# Patient Record
Sex: FEMALE | Race: BLACK OR AFRICAN AMERICAN | Employment: FULL TIME | URBAN - METROPOLITAN AREA
[De-identification: names, ages, dates, MRNs, and addresses within clinical notes are randomized per-mention and may not be internally consistent; named-entity substitution may affect disease eponyms.]

---

## 2017-08-21 ENCOUNTER — HOSPITAL ENCOUNTER (OUTPATIENT)
Dept: MAMMOGRAPHY | Age: 42
Discharge: HOME | End: 2017-08-21
Attending: INTERNAL MEDICINE
Payer: COMMERCIAL

## 2017-08-21 DIAGNOSIS — Z12.31 VISIT FOR SCREENING MAMMOGRAM: ICD-10-CM

## 2017-08-21 PROCEDURE — 77067 SCR MAMMO BI INCL CAD: CPT

## 2018-08-24 ENCOUNTER — HOSPITAL ENCOUNTER (OUTPATIENT)
Dept: MAMMOGRAPHY | Age: 43
Discharge: HOME | End: 2018-08-24
Attending: NURSE PRACTITIONER
Payer: COMMERCIAL

## 2018-08-24 DIAGNOSIS — Z12.31 VISIT FOR SCREENING MAMMOGRAM: ICD-10-CM

## 2018-08-24 PROCEDURE — 77067 SCR MAMMO BI INCL CAD: CPT

## 2019-01-16 ENCOUNTER — APPOINTMENT (OUTPATIENT)
Dept: CT IMAGING | Age: 44
End: 2019-01-16
Attending: EMERGENCY MEDICINE
Payer: COMMERCIAL

## 2019-01-16 ENCOUNTER — APPOINTMENT (OUTPATIENT)
Dept: MRI IMAGING | Age: 44
End: 2019-01-16
Attending: HOSPITALIST
Payer: COMMERCIAL

## 2019-01-16 ENCOUNTER — HOSPITAL ENCOUNTER (OUTPATIENT)
Age: 44
Setting detail: OBSERVATION
Discharge: HOME | End: 2019-01-17
Attending: EMERGENCY MEDICINE | Admitting: HOSPITALIST
Payer: COMMERCIAL

## 2019-01-16 ENCOUNTER — APPOINTMENT (OUTPATIENT)
Dept: GENERAL RADIOLOGY | Age: 44
End: 2019-01-16
Attending: EMERGENCY MEDICINE
Payer: COMMERCIAL

## 2019-01-16 DIAGNOSIS — I63.9 CEREBROVASCULAR ACCIDENT (CVA), UNSPECIFIED MECHANISM (HCC): Primary | ICD-10-CM

## 2019-01-16 LAB
ABO + RH BLD: NORMAL
ALBUMIN SERPL-MCNC: 3.3 G/DL (ref 3.4–5)
ALBUMIN/GLOB SERPL: 1 {RATIO} (ref 0.8–1.7)
ALP SERPL-CCNC: 111 U/L (ref 45–117)
ALT SERPL-CCNC: 13 U/L (ref 13–56)
AMPHET UR QL SCN: NEGATIVE
ANION GAP SERPL CALC-SCNC: 5 MMOL/L (ref 3–18)
APPEARANCE UR: ABNORMAL
ASPIRIN TEST, ASPIRN: 564 ARU (ref 620–672)
AST SERPL-CCNC: 14 U/L (ref 15–37)
ATRIAL RATE: 87 BPM
BACTERIA URNS QL MICRO: ABNORMAL /HPF
BARBITURATES UR QL SCN: NEGATIVE
BENZODIAZ UR QL: NEGATIVE
BILIRUB SERPL-MCNC: 0.3 MG/DL (ref 0.2–1)
BILIRUB UR QL: NEGATIVE
BLOOD GROUP ANTIBODIES SERPL: NORMAL
BUN SERPL-MCNC: 12 MG/DL (ref 7–18)
BUN/CREAT SERPL: 11 (ref 12–20)
CALCIUM SERPL-MCNC: 8.3 MG/DL (ref 8.5–10.1)
CALCULATED P AXIS, ECG09: 51 DEGREES
CALCULATED R AXIS, ECG10: 26 DEGREES
CALCULATED T AXIS, ECG11: 44 DEGREES
CANNABINOIDS UR QL SCN: NEGATIVE
CHLORIDE SERPL-SCNC: 106 MMOL/L (ref 100–108)
CHOLEST SERPL-MCNC: 153 MG/DL
CO2 SERPL-SCNC: 27 MMOL/L (ref 21–32)
COCAINE UR QL SCN: NEGATIVE
COLOR UR: YELLOW
CREAT SERPL-MCNC: 1.12 MG/DL (ref 0.6–1.3)
DIAGNOSIS, 93000: NORMAL
EPITH CASTS URNS QL MICRO: ABNORMAL /LPF (ref 0–5)
ERYTHROCYTE [DISTWIDTH] IN BLOOD BY AUTOMATED COUNT: 13.2 % (ref 11.6–14.5)
ERYTHROCYTE [SEDIMENTATION RATE] IN BLOOD: 7 MM/HR (ref 0–20)
EST. AVERAGE GLUCOSE BLD GHB EST-MCNC: 103 MG/DL
FIBRINOGEN PPP-MCNC: 464 MG/DL (ref 210–451)
GLOBULIN SER CALC-MCNC: 3.4 G/DL (ref 2–4)
GLUCOSE BLD STRIP.AUTO-MCNC: 123 MG/DL (ref 70–110)
GLUCOSE BLD STRIP.AUTO-MCNC: 83 MG/DL (ref 70–110)
GLUCOSE BLD STRIP.AUTO-MCNC: 83 MG/DL (ref 70–110)
GLUCOSE SERPL-MCNC: 86 MG/DL (ref 74–99)
GLUCOSE UR STRIP.AUTO-MCNC: NEGATIVE MG/DL
HBA1C MFR BLD: 5.2 % (ref 4.2–5.6)
HCT VFR BLD AUTO: 43.5 % (ref 35–45)
HDLC SERPL-MCNC: 42 MG/DL (ref 40–60)
HDLC SERPL: 3.6 {RATIO} (ref 0–5)
HDSCOM,HDSCOM: NORMAL
HGB BLD-MCNC: 14.4 G/DL (ref 12–16)
HGB UR QL STRIP: NEGATIVE
INR PPP: 1 (ref 0.8–1.2)
KETONES UR QL STRIP.AUTO: NEGATIVE MG/DL
LDLC SERPL CALC-MCNC: 87.6 MG/DL (ref 0–100)
LEUKOCYTE ESTERASE UR QL STRIP.AUTO: ABNORMAL
LIPID PROFILE,FLP: NORMAL
MCH RBC QN AUTO: 28.6 PG (ref 24–34)
MCHC RBC AUTO-ENTMCNC: 33.1 G/DL (ref 31–37)
MCV RBC AUTO: 86.5 FL (ref 74–97)
METHADONE UR QL: NEGATIVE
NITRITE UR QL STRIP.AUTO: NEGATIVE
OPIATES UR QL: NEGATIVE
P-R INTERVAL, ECG05: 180 MS
PCP UR QL: NEGATIVE
PH UR STRIP: 6 [PH] (ref 5–8)
PLATELET # BLD AUTO: 170 K/UL (ref 135–420)
PMV BLD AUTO: 10.4 FL (ref 9.2–11.8)
POTASSIUM SERPL-SCNC: 4.3 MMOL/L (ref 3.5–5.5)
PROT SERPL-MCNC: 6.7 G/DL (ref 6.4–8.2)
PROT UR STRIP-MCNC: NEGATIVE MG/DL
PROTHROMBIN TIME: 13.1 SEC (ref 11.5–15.2)
Q-T INTERVAL, ECG07: 370 MS
QRS DURATION, ECG06: 94 MS
QTC CALCULATION (BEZET), ECG08: 445 MS
RBC # BLD AUTO: 5.03 M/UL (ref 4.2–5.3)
RBC #/AREA URNS HPF: 0 /HPF (ref 0–5)
SODIUM SERPL-SCNC: 138 MMOL/L (ref 136–145)
SP GR UR REFRACTOMETRY: 1.02 (ref 1–1.03)
SPECIMEN EXP DATE BLD: NORMAL
T3FREE SERPL-MCNC: 2.2 PG/ML (ref 2.18–3.98)
T4 FREE SERPL-MCNC: 1 NG/DL (ref 0.7–1.5)
THROMBIN TIME: 14.9 SECS (ref 13.8–18.2)
TRIGL SERPL-MCNC: 117 MG/DL (ref ?–150)
TSH SERPL DL<=0.05 MIU/L-ACNC: 1.5 UIU/ML (ref 0.36–3.74)
UROBILINOGEN UR QL STRIP.AUTO: 1 EU/DL (ref 0.2–1)
VENTRICULAR RATE, ECG03: 87 BPM
VLDLC SERPL CALC-MCNC: 23.4 MG/DL
WBC # BLD AUTO: 11.7 K/UL (ref 4.6–13.2)
WBC URNS QL MICRO: ABNORMAL /HPF (ref 0–4)

## 2019-01-16 PROCEDURE — 82962 GLUCOSE BLOOD TEST: CPT

## 2019-01-16 PROCEDURE — 85384 FIBRINOGEN ACTIVITY: CPT

## 2019-01-16 PROCEDURE — 71045 X-RAY EXAM CHEST 1 VIEW: CPT

## 2019-01-16 PROCEDURE — 85652 RBC SED RATE AUTOMATED: CPT

## 2019-01-16 PROCEDURE — 70544 MR ANGIOGRAPHY HEAD W/O DYE: CPT

## 2019-01-16 PROCEDURE — 74011250636 HC RX REV CODE- 250/636: Performed by: HOSPITALIST

## 2019-01-16 PROCEDURE — 92610 EVALUATE SWALLOWING FUNCTION: CPT

## 2019-01-16 PROCEDURE — 85670 THROMBIN TIME PLASMA: CPT

## 2019-01-16 PROCEDURE — 96361 HYDRATE IV INFUSION ADD-ON: CPT

## 2019-01-16 PROCEDURE — 93005 ELECTROCARDIOGRAM TRACING: CPT

## 2019-01-16 PROCEDURE — 85027 COMPLETE CBC AUTOMATED: CPT

## 2019-01-16 PROCEDURE — 74011250637 HC RX REV CODE- 250/637: Performed by: HOSPITALIST

## 2019-01-16 PROCEDURE — 83036 HEMOGLOBIN GLYCOSYLATED A1C: CPT

## 2019-01-16 PROCEDURE — 99285 EMERGENCY DEPT VISIT HI MDM: CPT

## 2019-01-16 PROCEDURE — 77030021566 MRA NECK W WO CONT

## 2019-01-16 PROCEDURE — 99218 HC RM OBSERVATION: CPT

## 2019-01-16 PROCEDURE — 65660000000 HC RM CCU STEPDOWN

## 2019-01-16 PROCEDURE — 80307 DRUG TEST PRSMV CHEM ANLYZR: CPT

## 2019-01-16 PROCEDURE — 70450 CT HEAD/BRAIN W/O DYE: CPT

## 2019-01-16 PROCEDURE — 84439 ASSAY OF FREE THYROXINE: CPT

## 2019-01-16 PROCEDURE — 85576 BLOOD PLATELET AGGREGATION: CPT

## 2019-01-16 PROCEDURE — 81001 URINALYSIS AUTO W/SCOPE: CPT

## 2019-01-16 PROCEDURE — A9575 INJ GADOTERATE MEGLUMI 0.1ML: HCPCS | Performed by: HOSPITALIST

## 2019-01-16 PROCEDURE — 77030021352 HC CBL LD SYS DISP COVD -B

## 2019-01-16 PROCEDURE — 94760 N-INVAS EAR/PLS OXIMETRY 1: CPT

## 2019-01-16 PROCEDURE — 96372 THER/PROPH/DIAG INJ SC/IM: CPT

## 2019-01-16 PROCEDURE — 86900 BLOOD TYPING SEROLOGIC ABO: CPT

## 2019-01-16 PROCEDURE — 74011000258 HC RX REV CODE- 258: Performed by: HOSPITALIST

## 2019-01-16 PROCEDURE — 86141 C-REACTIVE PROTEIN HS: CPT

## 2019-01-16 PROCEDURE — 74011250637 HC RX REV CODE- 250/637: Performed by: EMERGENCY MEDICINE

## 2019-01-16 PROCEDURE — 84481 FREE ASSAY (FT-3): CPT

## 2019-01-16 PROCEDURE — 96360 HYDRATION IV INFUSION INIT: CPT

## 2019-01-16 PROCEDURE — 85610 PROTHROMBIN TIME: CPT

## 2019-01-16 PROCEDURE — 84443 ASSAY THYROID STIM HORMONE: CPT

## 2019-01-16 PROCEDURE — 80053 COMPREHEN METABOLIC PANEL: CPT

## 2019-01-16 PROCEDURE — 80061 LIPID PANEL: CPT

## 2019-01-16 PROCEDURE — 70553 MRI BRAIN STEM W/O & W/DYE: CPT

## 2019-01-16 RX ORDER — ASPIRIN 325 MG
325 TABLET ORAL
Status: COMPLETED | OUTPATIENT
Start: 2019-01-16 | End: 2019-01-16

## 2019-01-16 RX ORDER — DEXTROSE MONOHYDRATE AND SODIUM CHLORIDE 5; .9 G/100ML; G/100ML
0.75 INJECTION, SOLUTION INTRAVENOUS CONTINUOUS
Status: DISCONTINUED | OUTPATIENT
Start: 2019-01-16 | End: 2019-01-17

## 2019-01-16 RX ORDER — ATORVASTATIN CALCIUM 40 MG/1
80 TABLET, FILM COATED ORAL
Status: DISCONTINUED | OUTPATIENT
Start: 2019-01-16 | End: 2019-01-17 | Stop reason: HOSPADM

## 2019-01-16 RX ORDER — ACETAMINOPHEN 325 MG/1
650 TABLET ORAL
Status: DISCONTINUED | OUTPATIENT
Start: 2019-01-16 | End: 2019-01-17 | Stop reason: HOSPADM

## 2019-01-16 RX ORDER — ONDANSETRON 2 MG/ML
2 INJECTION INTRAMUSCULAR; INTRAVENOUS
Status: DISCONTINUED | OUTPATIENT
Start: 2019-01-16 | End: 2019-01-17 | Stop reason: HOSPADM

## 2019-01-16 RX ORDER — ATORVASTATIN CALCIUM 40 MG/1
80 TABLET, FILM COATED ORAL
Status: DISCONTINUED | OUTPATIENT
Start: 2019-01-16 | End: 2019-01-16

## 2019-01-16 RX ORDER — ENOXAPARIN SODIUM 100 MG/ML
40 INJECTION SUBCUTANEOUS EVERY 24 HOURS
Status: DISCONTINUED | OUTPATIENT
Start: 2019-01-16 | End: 2019-01-17 | Stop reason: HOSPADM

## 2019-01-16 RX ORDER — GADOTERATE MEGLUMINE 376.9 MG/ML
20 INJECTION INTRAVENOUS
Status: COMPLETED | OUTPATIENT
Start: 2019-01-16 | End: 2019-01-16

## 2019-01-16 RX ORDER — AMOXICILLIN 250 MG
2 CAPSULE ORAL
Status: DISCONTINUED | OUTPATIENT
Start: 2019-01-16 | End: 2019-01-17 | Stop reason: HOSPADM

## 2019-01-16 RX ORDER — ASPIRIN 325 MG
325 TABLET ORAL DAILY
Status: DISCONTINUED | OUTPATIENT
Start: 2019-01-17 | End: 2019-01-17 | Stop reason: HOSPADM

## 2019-01-16 RX ADMIN — DOCUSATE SODIUM AND SENNOSIDES 2 TABLET: 8.6; 5 TABLET, FILM COATED ORAL at 22:11

## 2019-01-16 RX ADMIN — GADOTERATE MEGLUMINE 20 ML: 376.9 INJECTION INTRAVENOUS at 20:46

## 2019-01-16 RX ADMIN — ENOXAPARIN SODIUM 40 MG: 40 INJECTION, SOLUTION INTRAVENOUS; SUBCUTANEOUS at 16:05

## 2019-01-16 RX ADMIN — DEXTROSE MONOHYDRATE AND SODIUM CHLORIDE 0.75 ML/KG/HR: 5; .9 INJECTION, SOLUTION INTRAVENOUS at 14:44

## 2019-01-16 RX ADMIN — ASPIRIN 325 MG ORAL TABLET 325 MG: 325 PILL ORAL at 12:13

## 2019-01-16 RX ADMIN — ATORVASTATIN CALCIUM 80 MG: 40 TABLET, FILM COATED ORAL at 22:10

## 2019-01-16 NOTE — PROGRESS NOTES
completed the initial Spiritual Assessment of the patient in bed 3 of the emergency room  and offered Pastoral Care support. . Patient does not have any Religion/cultural needs that will affect patients preferences in health care. Chaplains will continue to follow and will provide pastoral care on an as needed/requested basis. Dominic Killian Board Certified [employer] Spiritual Care Department [phone]

## 2019-01-16 NOTE — H&P
Medicine History and Physical 
Patient: Zohreh Pennington   Age:  44 y.o. Assessment Principal Problem: 
  CVA (cerebral vascular accident) ([facility] [address] (1/16/2019) Plan 1)  CVA 
 - possible, has sensory deficit L facial and left arm and facial droop 
 - ct negative, MRI MRA head and neck. Bedside nurse speech eval, PT/OT 
 - tele monitor, neurochecks, asa statin 
 - echo DISPO 
-Pt to be admitted  at this time for reasons addressed above, continued hospitalization for ongoing assessment and treatment indicated Anticipated Date of Discharge: 1-2 days Anticipated Disposition (home, SNF) : home Chief Complaint:  
Chief Complaint Patient presents with  Tingling  Facial Droop HPI:  
Zohreh Pennington is a 44y.o. year old female who presents with  L facial droop. Patient was at work today and coworkers noted facial droop and told her to home to ED. She has history of migraines but no other significant history. In ED ct head was negative, she refused tpa when seen by tele neuro. She will be admitted for further CVA workup Review of Systems - positive responses in bold type Constitutional: Negative for fever, chills, diaphoresis and unexpected weight change. HENT: Negative for ear pain, congestion, sore throat, rhinorrhea, drooling, trouble swallowing, neck pain and tinnitus. Eyes: Negative for photophobia, pain, redness and visual disturbance. Respiratory: negative for shortness of breath, cough, choking, chest tightness, wheezing or stridor. Cardiovascular: Negative for chest pain, palpitations and leg swelling. Gastrointestinal: Negative for nausea, vomiting, abdominal pain, diarrhea, constipation, blood in stool, abdominal distention and anal bleeding. Genitourinary: Negative for dysuria, urgency, frequency, hematuria, flank pain and difficulty urinating. Musculoskeletal: Negative for back pain and arthralgias. Skin: Negative for color change, rash and wound. Neurological: Negative for dizziness, seizures, syncope, speech difficulty, light-headedness or headaches. Hematological: Does not bruise/bleed easily. Psychiatric/Behavioral: Negative for suicidal ideas, hallucinations, behavioral problems, self-injury or agitation Past Medical History: 
Past Medical History:  
Diagnosis Date  Migraines Past Surgical History: 
Past Surgical History:  
Procedure Laterality Date [address]  
 two times  HX HYSTERECTOMY Family History: 
Family History Problem Relation Age of Onset  Cancer Other   
     mother side of family  Diabetes Other   
     mother side of family  Hypertension Other   
     mother side of family  Breast Cancer Paternal Grandmother   
       Breast Cancer Maternal Aunt  Social History: 
Social History Socioeconomic History  Marital status:  Spouse name: Not on file  Number of children: Not on file  Years of education: Not on file  Highest education level: Not on file Tobacco Use  Smoking status: Current Every Day Smoker Packs/day: 0.50 Types: Cigarettes  Smokeless tobacco: Never Used Substance and Sexual Activity  Alcohol use: Yes Alcohol/week: 0.5 oz Types: 1 drink(s) per week Comment: wine occasionally  Drug use: No  
 
 
Home Medications: 
Prior to Admission medications Not on File Allergies: Allergies Allergen Reactions  Banana Nausea Only  Seasonale [Levonorgestrel-Ethinyl Estrad] Runny Nose  Shellfish Derived Nausea Only  Wellbutrin [Bupropion] Other (comments) Amnesia type symptoms Physical Exam:  
 
Visit Vitals /79 (BP 1 Location: Right arm, BP Patient Position: At rest) Pulse 71 Temp 97.8 °F (36.6 °C) Resp 16 Ht 5' 9\" (1.753 m) Wt 113.4 kg (249 lb 16 oz) SpO2 100% BMI 36.92 kg/m² Physical Exam: 
General appearance: alert, cooperative, no distress, appears stated age Head: Normocephalic, without obvious abnormality, atraumatic Neck: supple, trachea midline Lungs: clear to auscultation bilaterally Heart: regular rate and rhythm, S1, S2 normal, no murmur, click, rub or gallop Abdomen: soft, non-tender. Bowel sounds normal. No masses,  no organomegaly Extremities: extremities normal, atraumatic, no cyanosis or edema Skin: Skin color, texture, turgor normal. No rashes or lesions Neurologic: mild facial droop, reported sensory deficit left face and left upper arm. Intake and Output: 
Current Shift:  No intake/output data recorded. Last three shifts:  No intake/output data recorded. Lab/Data Reviewed: 
CMP:  
Lab Results Component Value Date/Time  01/16/2019 11:30 AM  
 K 4.3 01/16/2019 11:30 AM  
  01/16/2019 11:30 AM  
 CO2 27 01/16/2019 11:30 AM  
 AGAP 5 01/16/2019 11:30 AM  
 GLU 86 01/16/2019 11:30 AM  
 BUN 12 01/16/2019 11:30 AM  
 CREA 1.12 01/16/2019 11:30 AM  
 GFRAA >60 01/16/2019 11:30 AM  
 GFRNA 53 (L) 01/16/2019 11:30 AM  
 CA 8.3 (L) 01/16/2019 11:30 AM  
 ALB 3.3 (L) 01/16/2019 11:30 AM  
 TP 6.7 01/16/2019 11:30 AM  
 GLOB 3.4 01/16/2019 11:30 AM  
 AGRAT 1.0 01/16/2019 11:30 AM  
 SGOT 14 (L) 01/16/2019 11:30 AM  
 ALT 13 01/16/2019 11:30 AM  
 
CBC:  
Lab Results Component Value Date/Time WBC 11.7 01/16/2019 11:30 AM  
 HGB 14.4 01/16/2019 11:30 AM  
 HCT 43.5 01/16/2019 11:30 AM  
  01/16/2019 11:30 AM  
 
All Cardiac Markers in the last 24 hours: No results found for: CPK, CK, CKMMB, CKMB, RCK3, CKMBT, CKNDX, CKND1, TERRI, TROPT, TROIQ, THERON, TROPT, TNIPOC, BNP, BNPP Shari Warren MD 
 
January 16, 2019

## 2019-01-16 NOTE — PROGRESS NOTES
Problem: Dysphagia (Adult) Goal: *Acute Goals and Plan of Care (Insert Text) Dysphagia Present:   No 
 
Recommendations: 
Diet: Regular/thin Meds: Per patient preference Patient will: 1. Participate in training and education related to continued aspiration risk, diet recs and compensatory strategies (goal met). Outcome: Resolved/Met Date Met: 01/16/19 Speech LAnguage Pathology bedside swallow evaluation AND DISCHARGE Patient: Leonardo Montesinos (23 y.o. female) Date: 1/16/2019 Primary Diagnosis: CVA (cerebral vascular accident) ([address] Precautions: None PLOF: Independent ASSESSMENT : 
Clinical beside swallow eval completed per MD orders. Pt A&Ox4. Functional communication. Intelligibility >90%. Cognitive-linguistic function appears intact. OM examination revealed oral motor structures functional for mastication and deglutition. Presented with thin liquid, puree, and solid trials. Exhibited + bolus cohesion, manipulation and A-P transit. Further exhibited + swallow timing/reflex and hyolaryngeal excursion. Pt able to manipulate and clear with 0 clinical s/s aspiration and/or oropharyngeal dysphagia. Pt safe for regular solid, thin liquid diet. 0 formal ST needs for dysphagia indicated at this time. SLP educated pt on role of speech therapist in current setting with re: speech/swallow; verbalized comprehension. SLP available for re-evaluation if indicated by MD. Results d/w RN, Angelica Hall. Thank you for this referral.  
Corrie Rios, SLP  
 
PLAN : 
Recommendations and Planned Interventions: 
No formal ST needs ID'd for dysphagia. Eval only. Discharge Recommendations: None SUBJECTIVE:  
Patient stated I haven't had pudding in forever. OBJECTIVE:  
 
Past Medical History:  
Diagnosis Date  Migraines Past Surgical History:  
Procedure Laterality Date [address]  
 two times  HX HYSTERECTOMY Prior Level of Function/Home Situation: Independent Diet prior to admission: Regular/thin Current Diet:  Regular/thin Cognitive and Communication Status: 
Neurologic State: Alert Orientation Level: Oriented X4 Cognition: Appropriate for age attention/concentration, Follows commands The severity rating is based on the following outcomes: EDDIE Noms Swallow Level 7 Clinical Judgement PAIN: 
Start of Eval: 0 End of Eval: 0 After evaluation:  
[]            Patient left in no apparent distress sitting up in chair 
[x]            Patient left in no apparent distress in bed 
[x]            Call bell left within reach [x]            Nursing notified 
[x]            Family present 
[]            Caregiver present 
[]            Bed alarm activated COMMUNICATION/EDUCATION:  
[x]            Aspiration precautions; swallow safety; compensatory techniques. [x]            Patient/family have participated as able in goal setting and plan of care. [x]            Patient/family agree to work toward stated goals and plan of care. []            Patient understands intent and goals of therapy; neutral about participation. []            Patient unable to participate in goal setting/plan of care; educ ongoing with interdisciplinary staff 
[]         Posted safety precautions in patient's room. Thank you for this referral. 
Rommel Warren SLP Time Calculation: 14 mins

## 2019-01-16 NOTE — PROGRESS NOTES
Problem: Falls - Risk of 
Goal: *Absence of Falls Document Brittanie Landa Fall Risk and appropriate interventions in the flowsheet. Outcome: Progressing Towards Goal 
Fall Risk Interventions: 
  
 
  
 
Medication Interventions: Patient to call before getting OOB

## 2019-01-16 NOTE — PROGRESS NOTES
1443: PT orders received and chart reviewed. PT screen indicated. Patient reports that she is independent with mobility and does not need skilled PT. Will d/c orders at this time. No d/c recommendations. Santhosh Garica PT, DPT Office extension: [phone] Pager #: [phone]

## 2019-01-16 NOTE — ED NOTES
Pt transported to 2105 for admission. Pavan Ragland RN present to receive pt and perform dual NIH. Report given and all questions answered. Pt in no distress at time of admission.

## 2019-01-16 NOTE — ED TRIAGE NOTES
\"I started having a tingling in the back of my head, then my vision went blurry. My coworker said that the left side of my face went droopy. The left side of my face feels a little numb too. \"

## 2019-01-16 NOTE — ED NOTES
TRANSFER - ED to INPATIENT REPORT: 
 
SBAR report made available to receiving floor on this patient being transferred to [facility] (2100  for routine progression of care Admitting diagnosis CVA (cerebral vascular accident) ([facility] [address] Information from the following report(s) SBAR, ED Summary and Intake/Output was made available to receiving floor. Lines:  
Peripheral IV 01/16/19 Left Antecubital (Active) Site Assessment Clean, dry, & intact 1/16/2019 12:16 PM  
Phlebitis Assessment 0 1/16/2019 12:16 PM  
Infiltration Assessment 0 1/16/2019 12:16 PM  
Dressing Status Clean, dry, & intact 1/16/2019 12:16 PM  
Dressing Type Transparent 1/16/2019 12:16 PM  
Hub Color/Line Status Pink 1/16/2019 12:16 PM  
  
 
 
Patient is oriented to time, place, person and situation Valuables transported with patient Vitals w/ MEWS Score (last day) Date/Time MEWS Score Pulse Resp Temp BP Level of Consciousness SpO2  
 01/16/19 1300    80  18    104/67    100 % 01/16/19 1245    79  19    126/57    99 % 01/16/19 1230    83  17    115/60    95 % 01/16/19 1215    82  18  98.5 °F (36.9 °C)  107/71    81 %  (Abnormal) 01/16/19 1200    93      121/78    100 % 01/16/19 1150    86  20    105/69    100 % 01/16/19 1145    88  19    127/78    100 % 01/16/19 1140    88  16    124/84    100 % 01/16/19 1135    87  22    115/86    100 % 01/16/19 1130    85  19    80/65  (Abnormal)     100 % 01/16/19 1125    87  16    108/59    100 % 01/16/19 1122    87  19        100 % 01/16/19 1121    84  17        100 % 01/16/19 1120    85  16        100 % 01/16/19 1119    83  16        100 % 01/16/19 1118    82  15        100 % 01/16/19 1113          104/59      
 01/16/19 1102    101  (Abnormal)   16    145/96  (Abnormal)   Alert  98 %

## 2019-01-17 ENCOUNTER — APPOINTMENT (OUTPATIENT)
Dept: NON INVASIVE DIAGNOSTICS | Age: 44
End: 2019-01-17
Attending: HOSPITALIST
Payer: COMMERCIAL

## 2019-01-17 VITALS
HEART RATE: 79 BPM | DIASTOLIC BLOOD PRESSURE: 70 MMHG | TEMPERATURE: 97.3 F | WEIGHT: 249 LBS | RESPIRATION RATE: 17 BRPM | BODY MASS INDEX: 36.88 KG/M2 | SYSTOLIC BLOOD PRESSURE: 104 MMHG | OXYGEN SATURATION: 93 % | HEIGHT: 69 IN

## 2019-01-17 PROBLEM — G43.909 MIGRAINE: Status: ACTIVE | Noted: 2019-01-17

## 2019-01-17 PROBLEM — G45.9 TIA (TRANSIENT ISCHEMIC ATTACK): Status: ACTIVE | Noted: 2019-01-17

## 2019-01-17 PROBLEM — G43.109 COMPLICATED MIGRAINE: Status: ACTIVE | Noted: 2019-01-17

## 2019-01-17 LAB
CRP SERPL HS-MCNC: 7.65 MG/L (ref 0–3)
GLUCOSE BLD STRIP.AUTO-MCNC: 83 MG/DL (ref 70–110)

## 2019-01-17 PROCEDURE — 82962 GLUCOSE BLOOD TEST: CPT

## 2019-01-17 PROCEDURE — 74011000258 HC RX REV CODE- 258: Performed by: HOSPITALIST

## 2019-01-17 PROCEDURE — 93306 TTE W/DOPPLER COMPLETE: CPT

## 2019-01-17 PROCEDURE — 96361 HYDRATE IV INFUSION ADD-ON: CPT

## 2019-01-17 PROCEDURE — 99218 HC RM OBSERVATION: CPT

## 2019-01-17 PROCEDURE — 74011250637 HC RX REV CODE- 250/637: Performed by: HOSPITALIST

## 2019-01-17 RX ORDER — ASPIRIN 81 MG/1
81 TABLET ORAL DAILY
Qty: 30 TAB | Refills: 0 | Status: SHIPPED | OUTPATIENT
Start: 2019-01-17 | End: 2021-10-19

## 2019-01-17 RX ADMIN — ASPIRIN 325 MG ORAL TABLET 325 MG: 325 PILL ORAL at 09:29

## 2019-01-17 RX ADMIN — DEXTROSE MONOHYDRATE AND SODIUM CHLORIDE 0.75 ML/KG/HR: 5; .9 INJECTION, SOLUTION INTRAVENOUS at 03:51

## 2019-01-17 NOTE — PROGRESS NOTES
NUTRITIONPatient/Family Education Record FACTORS THAT MAY INFLUENCE PATIENTS ABILITY TO LEARN: []   Language barrier    []   Cultural needs   []   Motivation  
 []   Cognitive limitation    []   Physical   []   Education  
[]   Physiological factors   []   Hearing/vision/speaking impairment   []   Orthodoxy beliefs []   Financial limitations    []  Other:   [x]   No barriers limiting ability to learn Person Instructed: [x]   Patient   []   Family   []  Other Preference for Learning: 
 [x]   Verbal   [x]   Written   []  Demonstration Patient educated on:  
[] Cardiac/heart healthy diet 
[] 2gm Sodium diet 
[] Vitamin K regulated diet (coumadin) [x] Weight loss/portion control 
[] High protein 
[] Other: 
 
Goal: 
Patient will demonstrate understanding of modified diet by implementing suggestions into lifestyle changes to decrease weight. Outcome:  
[x]  Patient verbalized understanding of education and willing to comply with recommendations. []  Patient declined education 
[]  Patient needs follow up education; scheduled date for follow up: 
[x]  Written information provided 
[x]  RD contact information provided Hugo Liz

## 2019-01-17 NOTE — ROUTINE PROCESS
Bedside and Verbal shift change report given to Yara Miles RN (oncoming nurse) by Carrie Rosado RN, BSN (offgoing nurse). Report given with SBAR, Kardex, Intake/Output, MAR and Recent Results.

## 2019-01-17 NOTE — PROGRESS NOTES
Patient and/or next of kin has been given the Outpatient Observation Information and Notification letter and all questions answered. Angela Sanchez RN,ext [phone].

## 2019-01-17 NOTE — PROGRESS NOTES
Reason for Admission:  TIA  R/O CVA RRAT Score:   1 Plan for utilizing home health:   Not home bound Likelihood of Readmission:  Low/Green Transition of Care Plan:  Home with out-pt follow up. Chart reviewed. Met with pt., verified all demographics. States has optima ins. States gets her medical follow up with Scott Gonzalez, last seen approx 1 yr ago. NOK: Bryn Jones, dtr, 23yrs old, lives with pt. Uses no DME. Independent with ADL's prior to admit. Available as needed. Angela Sanchez,RN,ext [phone]. Patient has designated no one to participate in his/her discharge plan and to receive any needed information, as she drove herself to hospital & will drive herself home. Name:  
Address: 
Phone number: 
 
Care Management Interventions PCP Verified by CM: Yes(Kavitha Pressley NP, last seen approx 1 yr ago) Palliative Care Criteria Met (RRAT>21 & CHF Dx)?: No 
Mode of Transport at Discharge: Self Transition of Care Consult (CM Consult): Discharge Planning Discharge Durable Medical Equipment: No 
Physical Therapy Consult: Yes Occupational Therapy Consult: Yes Speech Therapy Consult: Yes Current Support Network: Other(adult child lives with pt, 23yrs old) Confirm Follow Up Transport: Self Plan discussed with Pt/Family/Caregiver: Yes Discharge Location Discharge Placement: Home

## 2019-01-17 NOTE — ROUTINE PROCESS
8173 assumed care of pt after bedside verbal report was given by off going nurse, pt resting in bed awake, D5 normal saline infusing at 85.1 ml/hr, no acute distress noted, pt denies c/o pain, NIH scale completed,will monitor

## 2019-01-17 NOTE — PROGRESS NOTES
Nutrition initial assessment/Plan of care RECOMMENDATIONS:  
1. Regular Diet 2. Monitor labs, weight and PO intake 3. RD to follow GOALS:  
1. PO intake meets >75% of protein/calorie needs by 1/24 
2. Weight Maintenance/gradual loss (1-2 lb/week) by 1/24 ASSESSMENT:  
Wt status is classified as obese per BMI of 36.9. Adequate PO intake. Labs noted. BG range () over the past 24 hours. Pt lipid panel and A1c (5.2) WNL. Nutrition recommendations listed. RD to follow. Nutrition Diagnoses:  
Obesity related to excessive energy intake PTA as evidenced by a BMI of 36.9 and 172% IBW. Nutrition Risk:  [] High  [] Moderate [x]  Low SUBJECTIVE/OBJECTIVE:  
Pt admitted for CVA and complicated migraine. Pt seen in room OOB in chair at lunch; observed 100% of meal consumed. Denies having any problems chewing/swallowing and stable weight PTA. Reports having a good appetite and consuming 3 meals per day at home. Discussed general healthful diet, CHO choices and portion control; handouts provided. Will monitor. Information Obtained from:  
 [x] Chart Review [x] Patient 
 [] Family/Caregiver 
 [] Nurse/Physician 
 [] Interdisciplinary Meeting/Rounds Diet: Cardiac Diet Medications: [x] Reviewed Allergies: [x] Reviewed Encounter Diagnoses ICD-10-CM ICD-9-CM 1. C[facility]bro[facility] accident ([facility]), unspecified [facility] I63.9 434.91 Past Medical History:  
Diagnosis Date  Migraines Labs:   
Lab Results Component Value Date/Time Sodium 138 01/16/2019 11:30 AM  
 Potassium 4.3 01/16/2019 11:30 AM  
 Chloride 106 01/16/2019 11:30 AM  
 CO2 27 01/16/2019 11:30 AM  
 Anion gap 5 01/16/2019 11:30 AM  
 Glucose 86 01/16/2019 11:30 AM  
 BUN 12 01/16/2019 11:30 AM  
 Creatinine 1.12 01/16/2019 11:30 AM  
 Calcium 8.3 (L) 01/16/2019 11:30 AM  
 Albumin 3.3 (L) 01/16/2019 11:30 AM  
 
Lab Results Component Value Date/Time  Cholesterol, total 153 01/16/2019 11:30 AM  
 HDL Cholesterol 42 01/16/2019 11:30 AM  
 LDL, calculated 87.6 01/16/2019 11:30 AM  
 VLDL, calculated 23.4 01/16/2019 11:30 AM  
 Triglyceride 117 01/16/2019 11:30 AM  
 CHOL/HDL Ratio 3.6 01/16/2019 11:30 AM  
 
 
Anthropometrics: BMI (calculated): 36.9 Last 3 Recorded Weights in this Encounter 01/16/19 1102 01/16/19 1300 Weight: 113.4 kg (250 lb) 113.4 kg (249 lb 16 oz) Ht Readings from Last 1 Encounters:  
01/16/19 5' 9\" (1.753 m) Weight Metrics 1/16/2019 6/5/2015 5/15/2015 4/22/2015 4/13/2015 4/2/2015 Weight 250 lb 258 lb 258 lb 259 lb 6.4 oz 251 lb 259 lb BMI 36.92 kg/m2 37.02 kg/m2 37.02 kg/m2 37.22 kg/m2 36.01 kg/m2 -  
 
 
Patient Vitals for the past 100 hrs: 
 % Diet Eaten 01/16/19 2130 100 % IBW: 145 lb %IBW: 172% UBW: 250 lb   
[] Weight Loss [] Weight Gain [x] Weight Stable Estimated Nutrition Needs: [x] MSJ  [] Other: 
Calories: 2155-5959 kcal Based on:   [x] Actual BW   
Protein:   91 g Based on:   [x] Actual BW Fluid:       9023-8231 ml Based on:   [x] Actual BW  
 
 [x] No Cultural, Gnosticism or ethnic dietary need identified. [] Cultural, Gnosticism and ethnic food preferences identified and addressed Wt Status:  [] Normal (18.6 - 24.9) [] Underweight (<18.5) [] Overweight (25 - 29.9) [] Mild Obesity (30 - 34.9)  [x] Moderate Obesity (35 - 39.9) [] Morbid Obesity (40+) Nutrition Problems Identified:  
[] Suboptimal PO intake [x] Food Allergies (banana and shellfish) [] Difficulty chewing/swallowing/poor dentition 
[] Constipation/Diarrhea  
[] Nausea/Vomiting  
[] None 
[] Other:  
 
Plan:  
[] Therapeutic Diet 
[]  Obtained/adjusted food preferences/tolerances and/or snacks options  
[]  Supplements added  
[] Occupational therapy following for feeding techniques []  HS snack added  
[]  Modify diet texture  
[x]  Modify diet for food allergies [x]  Educate patient (healthful diet/CHO choices/portion control) []  Assist with menu selection [x]  Monitor PO intake on meal rounds  
[x]  Continue inpatient monitoring and intervention  
[]  Participated in discharge planning/Interdisciplinary rounds/Team meetings  
[]  Other:  
 
Education Needs: 
 [] Not appropriate for teaching at this time due to: 
 [x] Identified and addressed Nutrition Monitoring and Evaluation: 
[x] Continue ongoing monitoring and intervention 
[] Lloyd Obregon

## 2019-01-17 NOTE — DISCHARGE INSTRUCTIONS
DISCHARGE SUMMARY from Nurse    PATIENT INSTRUCTIONS:    After general anesthesia or intravenous sedation, for 24 hours or while taking prescription Narcotics:  · Limit your activities  · Do not drive and operate hazardous machinery  · Do not make important personal or business decisions  · Do  not drink alcoholic beverages  · If you have not urinated within 8 hours after discharge, please contact your surgeon on call. Report the following to your surgeon:  · Excessive pain, swelling, redness or odor of or around the surgical area  · Temperature over 100.5  · Nausea and vomiting lasting longer than 4 hours or if unable to take medications  · Any signs of decreased circulation or nerve impairment to extremity: change in color, persistent  numbness, tingling, coldness or increase pain  · Any questions    What to do at Home:  Recommended activity: Activity as tolerated,     If you experience any of the following symptoms as listed under \" When should I call for help? \" in your discharge teaching  , please follow up with your Doctor and/ or call 911. *  Please give a list of your current medications to your Primary Care Provider. *  Please update this list whenever your medications are discontinued, doses are      changed, or new medications (including over-the-counter products) are added. *  Please carry medication information at all times in case of emergency situations. These are general instructions for a healthy lifestyle:    No smoking/ No tobacco products/ Avoid exposure to second hand smoke  Surgeon General's Warning:  Quitting smoking now greatly reduces serious risk to your health.     Obesity, smoking, and sedentary lifestyle greatly increases your risk for illness    A healthy diet, regular physical exercise & weight monitoring are important for maintaining a healthy lifestyle    You may be retaining fluid if you have a history of heart failure or if you experience any of the following symptoms:  Weight gain of 3 pounds or more overnight or 5 pounds in a week, increased swelling in our hands or feet or shortness of breath while lying flat in bed. Please call your doctor as soon as you notice any of these symptoms; do not wait until your next office visit. Recognize signs and symptoms of STROKE:    F-face looks uneven    A-arms unable to move or move unevenly    S-speech slurred or non-existent    T-time-call 911 as soon as signs and symptoms begin-DO NOT go       Back to bed or wait to see if you get better-TIME IS BRAIN. Warning Signs of HEART ATTACK     Call 911 if you have these symptoms:   Chest discomfort. Most heart attacks involve discomfort in the center of the chest that lasts more than a few minutes, or that goes away and comes back. It can feel like uncomfortable pressure, squeezing, fullness, or pain.  Discomfort in other areas of the upper body. Symptoms can include pain or discomfort in one or both arms, the back, neck, jaw, or stomach.  Shortness of breath with or without chest discomfort.  Other signs may include breaking out in a cold sweat, nausea, or lightheadedness. Don't wait more than five minutes to call 911 - MINUTES MATTER! Fast action can save your life. Calling 911 is almost always the fastest way to get lifesaving treatment. Emergency Medical Services staff can begin treatment when they arrive -- up to an hour sooner than if someone gets to the hospital by car. The discharge information has been reviewed with the patient. The patient verbalized understanding. Discharge medications reviewed with the patient and appropriate educational materials and side effects teaching were provided. Patient Education        Migraine Headache: Care Instructions  Your Care Instructions  Migraines are painful, throbbing headaches that often start on one side of the head. They may cause nausea and vomiting and make you sensitive to light, sound, or smell.   Without treatment, migraines can last from 4 hours to a few days. Medicines can help prevent migraines or stop them after they have started. Your doctor can help you find which ones work best for you. Follow-up care is a key part of your treatment and safety. Be sure to make and go to all appointments, and call your doctor if you are having problems. It's also a good idea to know your test results and keep a list of the medicines you take. How can you care for yourself at home? · Do not drive if you have taken a prescription pain medicine. · Rest in a quiet, dark room until your headache is gone. Close your eyes, and try to relax or go to sleep. Don't watch TV or read. · Put a cold, moist cloth or cold pack on the painful area for 10 to 20 minutes at a time. Put a thin cloth between the cold pack and your skin. · Use a warm, moist towel or a heating pad set on low to relax tight shoulder and neck muscles. · Have someone gently massage your neck and shoulders. · Take your medicines exactly as prescribed. Call your doctor if you think you are having a problem with your medicine. You will get more details on the specific medicines your doctor prescribes. · Be careful not to take pain medicine more often than the instructions allow. You could get worse or more frequent headaches when the medicine wears off. To prevent migraines  · Keep a headache diary so you can figure out what triggers your headaches. Avoiding triggers may help you prevent headaches. Record when each headache began, how long it lasted, and what the pain was like. (Was it throbbing, aching, stabbing, or dull?) Write down any other symptoms you had with the headache, such as nausea, flashing lights or dark spots, or sensitivity to bright light or loud noise. Note if the headache occurred near your period. List anything that might have triggered the headache.  Triggers may include certain foods (chocolate, cheese, wine) or odors, smoke, bright light, stress, or lack of sleep. · If your doctor has prescribed medicine for your migraines, take it as directed. You may have medicine that you take only when you get a migraine and medicine that you take all the time to help prevent migraines. ? If your doctor has prescribed medicine for when you get a headache, take it at the first sign of a migraine, unless your doctor has given you other instructions. ? If your doctor has prescribed medicine to prevent migraines, take it exactly as prescribed. Call your doctor if you think you are having a problem with your medicine. · Find healthy ways to deal with stress. Migraines are most common during or right after stressful times. Take time to relax before and after you do something that has caused a migraine in the past.  · Try to keep your muscles relaxed by keeping good posture. Check your jaw, face, neck, and shoulder muscles for tension. Try to relax them. When you sit at a desk, change positions often. And make sure to stretch for 30 seconds each hour. · Get plenty of sleep and exercise. · Eat meals on a regular schedule. Avoid foods and drinks that often trigger migraines. These include chocolate, alcohol (especially red wine and port), aspartame, monosodium glutamate (MSG), and some additives found in foods (such as hot dogs, taylor, cold cuts, aged cheeses, and pickled foods). · Limit caffeine. Don't drink too much coffee, tea, or soda. But don't quit caffeine suddenly. That can also give you migraines. · Do not smoke or allow others to smoke around you. If you need help quitting, talk to your doctor about stop-smoking programs and medicines. These can increase your chances of quitting for good. · If you are taking birth control pills or hormone therapy, talk to your doctor about whether they are triggering your migraines. When should you call for help? Call 911 anytime you think you may need emergency care.  For example, call if:    · You have signs of a stroke. These may include:  ? Sudden numbness, paralysis, or weakness in your face, arm, or leg, especially on only one side of your body. ? Sudden vision changes. ? Sudden trouble speaking. ? Sudden confusion or trouble understanding simple statements. ? Sudden problems with walking or balance. ? A sudden, severe headache that is different from past headaches.    Call your doctor now or seek immediate medical care if:    · You have new or worse nausea and vomiting.     · You have a new or higher fever.     · Your headache gets much worse.    Watch closely for changes in your health, and be sure to contact your doctor if:    · You are not getting better after 2 days (48 hours). Where can you learn more? Go to http://keila-miller.info/. Enter J293 in the search box to learn more about \"Migraine Headache: Care Instructions. \"  Current as of: June 4, 2018  Content Version: 11.8  © 6071-1987 [employer]. Care instructions adapted under license by [employer] (which disclaims liability or warranty for this information). If you have questions about a medical condition or this instruction, always ask your healthcare professional. [place] [address] any warranty or liability for your use of this information. Patient Education        Transient Ischemic Attack: Care Instructions  Your Care Instructions    A transient ischemic attack (TIA) is when blood flow to a part of your brain is blocked for a short time. A TIA is like a stroke but usually lasts only a few minutes. A TIA does not cause lasting brain damage. Any vision problems, slurred speech, or other symptoms usually go away in 10 to 20 minutes. But they may last for up to 24 hours. TIAs are often warning signs of a stroke. Some people who have a TIA may have a stroke in the future. A stroke can cause symptoms like those of a TIA. But a stroke causes lasting damage to your brain.   You can take steps to help prevent a stroke. One thing you can do is get early treatment. If you have other new symptoms, or if your symptoms do not get better, go back to the emergency room or call your doctor right away. Getting treatment right away may prevent long-term brain damage caused by a stroke. The doctor has checked you carefully, but problems can develop later. If you notice any problems or new symptoms, get medical treatment right away. Follow-up care is a key part of your treatment and safety. Be sure to make and go to all appointments, and call your doctor if you are having problems. It's also a good idea to know your test results and keep a list of the medicines you take. How can you care for yourself at home? Medicines    · Be safe with medicines. Take your medicines exactly as prescribed. Call your doctor if you think you are having a problem with your medicine.     · If you take a blood thinner, such as aspirin, be sure you get instructions about how to take your medicine safely. Blood thinners can cause serious bleeding problems.     · Call your doctor if you are not able to take your medicines for any reason.     · Do not take any over-the-counter medicines or herbal products without talking to your doctor first.     · If you take birth control pills or hormone therapy, talk to your doctor. Ask if these treatments are right for you.    Lifestyle changes    · Do not smoke. If you need help quitting, talk to your doctor about stop-smoking programs and medicines.     · Be active. If your doctor recommends it, get more exercise. Walking is a good choice. Bit by bit, increase the amount you walk every day. Try for at least 30 minutes on most days of the week. You also may want to swim, bike, or do other activities.     · Eat heart-healthy foods. These include fruits, vegetables, high-fiber foods, fish, and foods that are low in sodium, saturated fat, and trans fat.     · Stay at a healthy weight.  Lose weight if you need to.     · Limit alcohol to 2 drinks a day for men and 1 drink a day for women.    Staying healthy    · Manage other health problems such as diabetes, high blood pressure, and high cholesterol.     · Get the flu vaccine every year. When should you call for help? Call 911 anytime you think you may need emergency care. For example, call if:    · You have new or worse symptoms of a stroke. These may include:  ? Sudden numbness, tingling, weakness, or loss of movement in your face, arm, or leg, especially on only one side of your body. ? Sudden vision changes. ? Sudden trouble speaking. ? Sudden confusion or trouble understanding simple statements. ? Sudden problems with walking or balance. ? A sudden, severe headache that is different from past headaches. Call 911 even if these symptoms go away in a few minutes.     · You feel like you are having another TIA.    Watch closely for changes in your health, and be sure to contact your doctor if you have any problems. Where can you learn more? Go to http://keila-miller.info/. Enter [phone] in the search box to learn more about \"Transient Ischemic Attack: Care Instructions. \"  Current as of: November 21, 2017  Content Version: 11.8  © 8456-4185 [employer]. Care instructions adapted under license by [employer] (which disclaims liability or warranty for this information). If you have questions about a medical condition or this instruction, always ask your healthcare professional. [address] any warranty or liability for your use of this information.          ___________________________________________________________________________________________________________________________________

## 2019-01-17 NOTE — PROGRESS NOTES
Spoke with d/c provider, [email or web address], confirmed that statin is not being ordered due to LDL level of 87

## 2019-01-17 NOTE — PROGRESS NOTES
Problem: Falls - Risk of 
Goal: *Absence of Falls Document Char Mcgowan Fall Risk and appropriate interventions in the flowsheet. Outcome: Progressing Towards Goal 
Fall Risk Interventions: 
  
 
  
 
Medication Interventions: Patient to call before getting OOB

## 2019-01-17 NOTE — PROGRESS NOTES
Assume care of patient lying in bed watching TV. Alert and oriented X 4. Denies pain or discomfort at present. Bed locked in lowest position. Call light within reach and understand to use for assistance and needs. Dual NIH with Deirdre Kay RN. 
 
2005 Transported to [facility] via wheelchair with nurse. On cardiac monitor during MRI. 
 
2100 Returned to room from MRI via wheelchair. 18 NIH completed with score of 1. 
 
01/17/2018 
0759 Bedside and Verbal shift change report given to Rossi Sierra RN (oncoming nurse) by Rojas Manriquez RN (offgoing nurse). Report given with SBAR, Kardex, Intake/Output, MAR and Recent Results.

## 2019-01-17 NOTE — ROUTINE PROCESS
1454 pt dischrged to home, instructions given to pt on follow up appointments, medications, TIA/stroke education. Opportunity given for questions

## 2019-01-17 NOTE — PROGRESS NOTES
OT order received, chart reviewed. Screened pt; pt is at baseline with regard to ADLs & functional mobility. Formal evaluation is not indicated. Will discontinue OT orders at this time. Thank you. Laney Gordon, MS OTR/L Office Ext: [phone] Pager: [phone]

## 2019-01-17 NOTE — DISCHARGE SUMMARY
[facility] – [place]    Discharge Summary    Patient: Vicente Bedoya MRN: [id number]  CSN: [id number]    YOB: 1975  Age: 44 y.o. Sex: female    DOA: 1/16/2019 LOS:  LOS: 1 day   /Discharge Date: 1/17/2019     Admission Diagnoses: CVA (cerebral vascular accident) [facility])  Complicated migraine    Discharge Diagnoses:    Problem List as of 1/17/2019 Date Reviewed: 1/17/2019          Codes Class Noted - Resolved    * (Principal) TIA (transient ischemic attack) ICD-10-CM: G45.9  ICD-9-CM: 435.9  1/17/2019 - Present        RESOLVED: Fibroids ICD-10-CM: D21.9  ICD-9-CM: 215.9  4/22/2015 - 4/24/2015              Discharge Condition: Stable    Discharge To: Home    Consults: [facility] Course: 45 y/o [place] American female with hx of migraines presented to the ED on 1/16 with left facial droop. Pt was at work today and co-workers noted facial droop. She has a hx of migraines, states this is not typical of her migraines, and no other significant PMH. She came to the ED for further evaluation. CT head negative, she refused tPA when seen by tele neurology. She was admitted for further CVA rule out work up. MRI brain, MRA brain and MRA neck negative. Echo obtained, official read pending. Pt reports increased stress as she has been trying to buy/sell home- having to relocate for her job. Labs and vital signs stable. Pt reports mild numbness and tingling to left face/arm. She is appropriate for d/c home, to follow up with PCP within one week. Physical Exam:  General appearance: alert, cooperative, no distress, appears stated age  Head: Normocephalic, without obvious abnormality, atraumatic  Lungs: clear to auscultation bilaterally  Heart: regular rate and rhythm, S1, S2 normal, no murmur, click, rub or gallop  Abdomen: soft, non-tender.  Bowel sounds normal. No masses,  no organomegaly  Extremities: extremities normal, atraumatic, no cyanosis or edema  Skin: Skin color, texture, turgor normal. No rashes or lesions  Neurologic: Grossly normal,  equal, ambulates without difficulty  PSY: Mood and affect normal, appropriately behaved    Significant Diagnostic Studies: labs:   Recent Results (from the past 24 hour(s))   GLUCOSE, POC    Collection Time: 01/16/19 11:07 AM   Result Value Ref Range    Glucose (POC) 83 70 - 110 mg/dL   CBC W/O DIFF    Collection Time: 01/16/19 11:30 AM   Result Value Ref Range    WBC 11.7 4.6 - 13.2 K/uL    RBC 5.03 4.20 - 5.30 M/uL    HGB 14.4 12.0 - 16.0 g/dL    HCT 43.5 35.0 - 45.0 %    MCV 86.5 74.0 - 97.0 FL    MCH 28.6 24.0 - 34.0 PG    MCHC 33.1 31.0 - 37.0 g/dL    RDW 13.2 11.6 - 14.5 %    PLATELET 203 394 - 064 K/uL    MPV 10.4 9.2 - 18.3 FL   METABOLIC PANEL, COMPREHENSIVE    Collection Time: 01/16/19 11:30 AM   Result Value Ref Range    Sodium 138 136 - 145 mmol/L    Potassium 4.3 3.5 - 5.5 mmol/L    Chloride 106 100 - 108 mmol/L    CO2 27 21 - 32 mmol/L    Anion gap 5 3.0 - 18 mmol/L    Glucose 86 74 - 99 mg/dL    BUN 12 7.0 - 18 MG/DL    Creatinine 1.12 0.6 - 1.3 MG/DL    BUN/Creatinine ratio 11 (L) 12 - 20      GFR est AA >60 >60 ml/min/1.73m2    GFR est non-AA 53 (L) >60 ml/min/1.73m2    Calcium 8.3 (L) 8.5 - 10.1 MG/DL    Bilirubin, total 0.3 0.2 - 1.0 MG/DL    ALT (SGPT) 13 13 - 56 U/L    AST (SGOT) 14 (L) 15 - 37 U/L    Alk.  phosphatase 111 45 - 117 U/L    Protein, total 6.7 6.4 - 8.2 g/dL    Albumin 3.3 (L) 3.4 - 5.0 g/dL    Globulin 3.4 2.0 - 4.0 g/dL    A-G Ratio 1.0 0.8 - 1.7     PROTHROMBIN TIME + INR    Collection Time: 01/16/19 11:30 AM   Result Value Ref Range    Prothrombin time 13.1 11.5 - 15.2 sec    INR 1.0 0.8 - 1.2     THROMBIN TIME    Collection Time: 01/16/19 11:30 AM   Result Value Ref Range    Thrombin time 14.9 13.8 - 18.2 SECS   FIBRINOGEN    Collection Time: 01/16/19 11:30 AM   Result Value Ref Range    Fibrinogen 464 (H) 210 - 451 mg/dL   TYPE & SCREEN    Collection Time: 01/16/19 11:30 AM   Result Value Ref Range    Crossmatch Expiration 01/19/2019     ABO/Rh(D) [place] Richelle POSITIVE     Antibody screen NEG    ASPIRIN TEST    Collection Time: 01/16/19 11:30 AM   Result Value Ref Range    Aspirin test 564 (L) 620 - 672 ARU   LIPID PANEL    Collection Time: 01/16/19 11:30 AM   Result Value Ref Range    LIPID PROFILE          Cholesterol, total 153 <200 MG/DL    Triglyceride 117 <150 MG/DL    HDL Cholesterol 42 40 - 60 MG/DL    LDL, calculated 87.6 0 - 100 MG/DL    VLDL, calculated 23.4 MG/DL    CHOL/HDL Ratio 3.6 0 - 5.0     HEMOGLOBIN A1C WITH EAG    Collection Time: 01/16/19 11:30 AM   Result Value Ref Range    Hemoglobin A1c 5.2 4.2 - 5.6 %    Est. average glucose 103 mg/dL   SED RATE (ESR)    Collection Time: 01/16/19 11:30 AM   Result Value Ref Range    Sed rate, automated 7 0 - 20 mm/hr   CRP, HIGH SENSITIVITY    Collection Time: 01/16/19 11:30 AM   Result Value Ref Range    C-Reactive Protein, Cardiac 7.65 (H) 0.00 - 3.00 mg/L   TSH 3RD GENERATION    Collection Time: 01/16/19 11:30 AM   Result Value Ref Range    TSH 1.50 0.36 - 3.74 uIU/mL   T3, FREE    Collection Time: 01/16/19 11:30 AM   Result Value Ref Range    Triiodothyronine (T3), free 2.2 2.18 - 3.98 PG/ML   T4, FREE    Collection Time: 01/16/19 11:30 AM   Result Value Ref Range    T4, Free 1.0 0.7 - 1.5 NG/DL   URINALYSIS W/ RFLX MICROSCOPIC    Collection Time: 01/16/19 11:57 AM   Result Value Ref Range    Color YELLOW      Appearance CLOUDY      Specific gravity 1.019 1.005 - 1.030      pH (UA) 6.0 5.0 - 8.0      Protein NEGATIVE  NEG mg/dL    Glucose NEGATIVE  NEG mg/dL    Ketone NEGATIVE  NEG mg/dL    Bilirubin NEGATIVE  NEG      Blood NEGATIVE  NEG      Urobilinogen 1.0 0.2 - 1.0 EU/dL    Nitrites NEGATIVE  NEG      Leukocyte Esterase SMALL (A) NEG     DRUG SCREEN, URINE    Collection Time: 01/16/19 11:57 AM   Result Value Ref Range    BENZODIAZEPINES NEGATIVE  NEG      BARBITURATES NEGATIVE  NEG      THC (TH-CANNABINOL) NEGATIVE  NEG      OPIATES NEGATIVE  NEG      PCP(PHENCYCLIDINE) NEGATIVE  NEG      COCAINE NEGATIVE  NEG      AMPHETAMINES NEGATIVE  NEG      METHADONE NEGATIVE  NEG      HDSCOM (NOTE)    URINE MICROSCOPIC ONLY    Collection Time: 01/16/19 11:57 AM   Result Value Ref Range    WBC 4 to 10 0 - 4 /hpf    RBC 0 0 - 5 /hpf    Epithelial cells 3+ 0 - 5 /lpf    Bacteria 4+ (A) NEG /hpf   EKG, 12 LEAD, INITIAL    Collection Time: 01/16/19 11:57 AM   Result Value Ref Range    Ventricular Rate 87 BPM    Atrial Rate 87 BPM    P-R Interval 180 ms    QRS Duration 94 ms    Q-T Interval 370 ms    QTC Calculation (Bezet) 445 ms    Calculated P Axis 51 degrees    Calculated R Axis 26 degrees    Calculated T Axis 44 degrees    Diagnosis       Normal sinus rhythm  Possible Left atrial enlargement  Low voltage QRS  Cannot rule out Anterior infarct , age undetermined  Abnormal ECG  No previous ECGs available  Confirmed by Jerrica Moore ([id number]) on 1/16/2019 3:12:54 PM     GLUCOSE, POC    Collection Time: 01/16/19  4:50 PM   Result Value Ref Range    Glucose (POC) 123 (H) 70 - 110 mg/dL   GLUCOSE, POC    Collection Time: 01/16/19  9:28 PM   Result Value Ref Range    Glucose (POC) 83 70 - 110 mg/dL   GLUCOSE, POC    Collection Time: 01/17/19  8:25 AM   Result Value Ref Range    Glucose (POC) 83 70 - 110 mg/dL         Discharge Medications:     Current Discharge Medication List      START taking these medications    Details   aspirin delayed-release 81 mg tablet Take 1 Tab by mouth daily. Qty: 30 Tab, Refills: 0                 Activity: Activity as tolerated    Diet: Resume previous diet    Wound Care: None needed    Follow-up: Follow up with PCP within one week.     Discharge time: > 35 mins  Lizandro Galan NP  1/17/2019, 8:03 AM

## 2019-01-18 LAB
ECHO AO ROOT DIAM: 2.71 CM
ECHO LA VOL 2C: 98.04 ML (ref 22–52)
ECHO LA VOL 4C: 62.03 ML (ref 22–52)
ECHO LA VOLUME INDEX A2C: 43.24 ML/M2 (ref 16–28)
ECHO LA VOLUME INDEX A4C: 27.36 ML/M2 (ref 16–28)
ECHO LV EDV A2C: 120.1 ML
ECHO LV EDV A4C: 129.9 ML
ECHO LV EDV BP: 126.2 ML (ref 56–104)
ECHO LV EDV INDEX A4C: 57.3 ML/M2
ECHO LV EDV INDEX BP: 55.7 ML/M2
ECHO LV EDV NDEX A2C: 53 ML/M2
ECHO LV EJECTION FRACTION A2C: 64 %
ECHO LV EJECTION FRACTION A4C: 58 %
ECHO LV EJECTION FRACTION BIPLANE: 60.9 % (ref 55–100)
ECHO LV ESV A2C: 43.1 ML
ECHO LV ESV A4C: 54.7 ML
ECHO LV ESV BP: 49.3 ML (ref 19–49)
ECHO LV ESV INDEX A2C: 19 ML/M2
ECHO LV ESV INDEX A4C: 24.1 ML/M2
ECHO LV ESV INDEX BP: 21.7 ML/M2
ECHO LV INTERNAL DIMENSION DIASTOLIC: 4.25 CM (ref 3.9–5.3)
ECHO LV INTERNAL DIMENSION SYSTOLIC: 2.67 CM
ECHO LV IVSD: 1.17 CM (ref 0.6–0.9)
ECHO LV MASS 2D: 186.1 G (ref 67–162)
ECHO LV MASS INDEX 2D: 82.1 G/M2 (ref 43–95)
ECHO LV POSTERIOR WALL DIASTOLIC: 1.03 CM (ref 0.6–0.9)
ECHO LVOT DIAM: 1.97 CM
ECHO MV A VELOCITY: 46.07 CM/S
ECHO MV AREA PHT: 7.3 CM2
ECHO MV E DECELERATION TIME (DT): 104.6 MS
ECHO MV E VELOCITY: 0.67 CM/S
ECHO MV E/A RATIO: 0.01
ECHO MV PRESSURE HALF TIME (PHT): 30.3 MS

## 2020-06-19 ENCOUNTER — HOSPITAL ENCOUNTER (OUTPATIENT)
Dept: MAMMOGRAPHY | Age: 45
Discharge: HOME | End: 2020-06-19
Attending: NURSE PRACTITIONER
Payer: COMMERCIAL

## 2020-06-19 DIAGNOSIS — Z12.31 VISIT FOR SCREENING MAMMOGRAM: ICD-10-CM

## 2020-06-19 PROCEDURE — 77063 BREAST TOMOSYNTHESIS BI: CPT

## 2020-11-13 ENCOUNTER — HOSPITAL ENCOUNTER (EMERGENCY)
Age: 45
Discharge: HOME | End: 2020-11-13
Attending: EMERGENCY MEDICINE
Payer: COMMERCIAL

## 2020-11-13 VITALS
BODY MASS INDEX: 39.87 KG/M2 | DIASTOLIC BLOOD PRESSURE: 75 MMHG | OXYGEN SATURATION: 97 % | RESPIRATION RATE: 18 BRPM | HEART RATE: 75 BPM | WEIGHT: 270 LBS | TEMPERATURE: 98.1 F | SYSTOLIC BLOOD PRESSURE: 113 MMHG

## 2020-11-13 DIAGNOSIS — R07.9 CHEST PAIN, UNSPECIFIED TYPE: Primary | ICD-10-CM

## 2020-11-13 LAB
ALBUMIN SERPL-MCNC: 3.4 G/DL (ref 3.4–5)
ALBUMIN/GLOB SERPL: 0.8 {RATIO} (ref 0.8–1.7)
ALP SERPL-CCNC: 125 U/L (ref 45–117)
ALT SERPL-CCNC: 16 U/L (ref 13–56)
ANION GAP SERPL CALC-SCNC: 3 MMOL/L (ref 3–18)
AST SERPL-CCNC: 12 U/L (ref 10–38)
ATRIAL RATE: 99 BPM
BASOPHILS # BLD: 0 K/UL (ref 0–0.1)
BASOPHILS NFR BLD: 0 % (ref 0–2)
BILIRUB SERPL-MCNC: 0.4 MG/DL (ref 0.2–1)
BUN SERPL-MCNC: 10 MG/DL (ref 7–18)
BUN/CREAT SERPL: 9 (ref 12–20)
CALCIUM SERPL-MCNC: 8.9 MG/DL (ref 8.5–10.1)
CALCULATED P AXIS, ECG09: 45 DEGREES
CALCULATED R AXIS, ECG10: 70 DEGREES
CALCULATED T AXIS, ECG11: 63 DEGREES
CHLORIDE SERPL-SCNC: 107 MMOL/L (ref 100–111)
CK MB CFR SERPL CALC: NORMAL % (ref 0–4)
CK MB SERPL-MCNC: <1 NG/ML (ref 5–25)
CK SERPL-CCNC: 108 U/L (ref 26–192)
CO2 SERPL-SCNC: 28 MMOL/L (ref 21–32)
CREAT SERPL-MCNC: 1.17 MG/DL (ref 0.6–1.3)
DIAGNOSIS, 93000: NORMAL
DIFFERENTIAL METHOD BLD: ABNORMAL
EOSINOPHIL # BLD: 0.2 K/UL (ref 0–0.4)
EOSINOPHIL NFR BLD: 3 % (ref 0–5)
ERYTHROCYTE [DISTWIDTH] IN BLOOD BY AUTOMATED COUNT: 13 % (ref 11.6–14.5)
GLOBULIN SER CALC-MCNC: 4.1 G/DL (ref 2–4)
GLUCOSE SERPL-MCNC: 86 MG/DL (ref 74–99)
HCT VFR BLD AUTO: 46.9 % (ref 35–45)
HGB BLD-MCNC: 15.2 G/DL (ref 12–16)
LYMPHOCYTES # BLD: 1.9 K/UL (ref 0.9–3.6)
LYMPHOCYTES NFR BLD: 19 % (ref 21–52)
MCH RBC QN AUTO: 28.7 PG (ref 24–34)
MCHC RBC AUTO-ENTMCNC: 32.4 G/DL (ref 31–37)
MCV RBC AUTO: 88.7 FL (ref 74–97)
MONOCYTES # BLD: 0.8 K/UL (ref 0.05–1.2)
MONOCYTES NFR BLD: 8 % (ref 3–10)
NEUTS SEG # BLD: 6.7 K/UL (ref 1.8–8)
NEUTS SEG NFR BLD: 70 % (ref 40–73)
P-R INTERVAL, ECG05: 176 MS
PLATELET # BLD AUTO: 186 K/UL (ref 135–420)
PMV BLD AUTO: 10.4 FL (ref 9.2–11.8)
POTASSIUM SERPL-SCNC: 4 MMOL/L (ref 3.5–5.5)
PROT SERPL-MCNC: 7.5 G/DL (ref 6.4–8.2)
Q-T INTERVAL, ECG07: 348 MS
QRS DURATION, ECG06: 90 MS
QTC CALCULATION (BEZET), ECG08: 446 MS
RBC # BLD AUTO: 5.29 M/UL (ref 4.2–5.3)
SODIUM SERPL-SCNC: 138 MMOL/L (ref 136–145)
TROPONIN I SERPL-MCNC: <0.02 NG/ML (ref 0–0.04)
TROPONIN I SERPL-MCNC: <0.02 NG/ML (ref 0–0.04)
VENTRICULAR RATE, ECG03: 99 BPM
WBC # BLD AUTO: 9.7 K/UL (ref 4.6–13.2)

## 2020-11-13 PROCEDURE — 82553 CREATINE MB FRACTION: CPT

## 2020-11-13 PROCEDURE — 85025 COMPLETE CBC W/AUTO DIFF WBC: CPT

## 2020-11-13 PROCEDURE — 99285 EMERGENCY DEPT VISIT HI MDM: CPT

## 2020-11-13 PROCEDURE — 74011250637 HC RX REV CODE- 250/637: Performed by: EMERGENCY MEDICINE

## 2020-11-13 PROCEDURE — 93005 ELECTROCARDIOGRAM TRACING: CPT

## 2020-11-13 PROCEDURE — 80053 COMPREHEN METABOLIC PANEL: CPT

## 2020-11-13 RX ORDER — ASPIRIN 325 MG
325 TABLET ORAL
Status: COMPLETED | OUTPATIENT
Start: 2020-11-13 | End: 2020-11-13

## 2020-11-13 RX ADMIN — ASPIRIN 325 MG ORAL TABLET 325 MG: 325 PILL ORAL at 10:58

## 2020-11-13 NOTE — ED TRIAGE NOTES
Pt states she went to bed last night with a headache. Pt states she woke dias this morning with both af her arms numb with left sided chest tightness. Pt states she called the triage nurse from her insurance company and was told to come to the ED. Pt also states on her way driving here she felt like the left side of her face was drooping but it went away. Pt states she has a history of one TIA and the symptoms with the TIA was facial drooping and headache.

## 2020-11-13 NOTE — ED PROVIDER NOTES
EMERGENCY DEPARTMENT HISTORY AND PHYSICAL EXAM    9:35 AM      Date: 2020  Patient Name: Elysia Braun    History of Presenting Illness     Chief Complaint   Patient presents with    Chest Pain    Numbness         History Provided By: patient    Additional History (Context): Elysia Braun is a 41 y.o. female presents with patient with history of TIA migraines positive smoking history, woke up this morning at 6 AM with chest pain radiating to both arms severe. No hyperventilation tachycardia breathlessness shortness of breath. She changed physicians and the discomfort rapidly almost instantly subsided. She has been pain-free since then and is pain-free at the time of my exam.  She also reports bilateral paresthesias in her arms which have resolved. Kelsey Max PCP: Varun Wilks NP    Chief Complaint:   Duration:    Timing:    Location:   Quality:   Severity:   Modifying Factors:   Associated Symptoms:       Current Outpatient Medications   Medication Sig Dispense Refill    aspirin delayed-release 81 mg tablet Take 1 Tab by mouth daily. 30 Tab 0       Past History     Past Medical History:  Past Medical History:   Diagnosis Date    Migraines        Past Surgical History:  Past Surgical History:   Procedure Laterality Date    HX [address]    two times    HX HYSTERECTOMY         Family History:  Family History   Problem Relation Age of Onset    Cancer Other         mother side of family    Diabetes Other         mother side of family    Hypertension Other         mother side of family    Breast Cancer Paternal Grandmother             Breast Cancer Maternal Aunt                Social History:  Social History     Tobacco Use    Smoking status: Current Every Day Smoker     Packs/day: 0.50     Types: Cigarettes    Smokeless tobacco: Never Used   Substance Use Topics    Alcohol use:  Yes     Alcohol/week: 0.8 standard drinks     Types: 1 drink(s) per week     Comment: wine occasionally    Drug use: No       Allergies: Allergies   Allergen Reactions    Banana Nausea Only    Seasonale [Levonorgestrel-Ethinyl Estrad] Runny Nose    Shellfish Derived Nausea Only    Wellbutrin [Bupropion] Other (comments)     Amnesia type symptoms           Review of Systems     Review of Systems   Constitutional: Negative for diaphoresis and fever. HENT: Negative for congestion and sore throat. Eyes: Negative for pain and itching. Respiratory: Negative for cough and shortness of breath. Cardiovascular: Positive for chest pain. Negative for palpitations. Gastrointestinal: Negative for abdominal pain and diarrhea. Endocrine: Negative for polydipsia and polyuria. Genitourinary: Negative for dysuria and hematuria. Musculoskeletal: Negative for arthralgias and myalgias. Skin: Negative for rash and wound. Neurological: Negative for seizures and syncope. Hematological: Does not bruise/bleed easily. Psychiatric/Behavioral: Negative for agitation and hallucinations. Physical Exam       Patient Vitals for the past 12 hrs:   Temp Pulse Resp BP SpO2   11/13/20 1230  70  114/77 99 %   11/13/20 1215  74  118/75 98 %   11/13/20 1200  71  121/74 100 %   11/13/20 1145  74  114/70 98 %   11/13/20 1130  78  121/74 97 %   11/13/20 1115  75  103/64 96 %   11/13/20 1100  80  119/66 98 %   11/13/20 1045  76  112/75 97 %   11/13/20 1030  74  117/72 96 %   11/13/20 1015  77  116/70 98 %   11/13/20 1000  84  120/76 98 %   11/13/20 0945  82  128/86 97 %   11/13/20 0930    137/80 97 %   11/13/20 0922     99 %   11/13/20 0913 98.1 °F (36.7 °C) 97 18 (!) 140/84 98 %       Physical Exam  Vitals signs and nursing note reviewed. Constitutional:       General: She is not in acute distress. Appearance: She is well-developed. She is obese. She is not ill-appearing. HENT:      Head: Normocephalic and atraumatic. Eyes:      General: No scleral icterus. Conjunctiva/sclera: Conjunctivae normal.   Neck:      Musculoskeletal: Normal range of motion and neck supple. Vascular: No JVD. Cardiovascular:      Rate and Rhythm: Normal rate and regular rhythm. Heart sounds: Normal heart sounds. Comments: 4 intact extremity pulses  Pulmonary:      Effort: Pulmonary effort is normal.      Breath sounds: Normal breath sounds. Chest:      Chest wall: No tenderness. Abdominal:      Palpations: Abdomen is soft. There is no mass. Tenderness: There is no abdominal tenderness. Musculoskeletal: Normal range of motion. Comments: She notes some of her symptoms can be reproduced by raising her left arm, abducting at the shoulder   Lymphadenopathy:      Cervical: No cervical adenopathy. Skin:     General: Skin is warm and dry. Neurological:      Mental Status: She is alert. Diagnostic Study Results   Labs -  Recent Results (from the past 12 hour(s))   EKG, 12 LEAD, INITIAL    Collection Time: 11/13/20  9:03 AM   Result Value Ref Range    Ventricular Rate 99 BPM    Atrial Rate 99 BPM    P-R Interval 176 ms    QRS Duration 90 ms    Q-T Interval 348 ms    QTC Calculation (Bezet) 446 ms    Calculated P Axis 45 degrees    Calculated R Axis 70 degrees    Calculated T Axis 63 degrees    Diagnosis       Normal sinus rhythm  Low voltage QRS  Borderline ECG  When compared with ECG of 16-JAN-2019 11:57,  No significant change was found     CBC WITH AUTOMATED DIFF    Collection Time: 11/13/20  9:35 AM   Result Value Ref Range    WBC 9.7 4.6 - 13.2 K/uL    RBC 5.29 4. 20 - 5.30 M/uL    HGB 15.2 12.0 - 16.0 g/dL    HCT 46.9 (H) 35.0 - 45.0 %    MCV 88.7 74.0 - 97.0 FL    MCH 28.7 24.0 - 34.0 PG    MCHC 32.4 31.0 - 37.0 g/dL    RDW 13.0 11.6 - 14.5 %    PLATELET 390 772 - 297 K/uL    MPV 10.4 9.2 - 11.8 FL    NEUTROPHILS 70 40 - 73 %    LYMPHOCYTES 19 (L) 21 - 52 %    MONOCYTES 8 3 - 10 %    EOSINOPHILS 3 0 - 5 %    BASOPHILS 0 0 - 2 %    ABS.  NEUTROPHILS 6.7 1.8 - 8.0 K/UL    ABS. LYMPHOCYTES 1.9 0.9 - 3.6 K/UL    ABS. MONOCYTES 0.8 0.05 - 1.2 K/UL    ABS. EOSINOPHILS 0.2 0.0 - 0.4 K/UL    ABS. BASOPHILS 0.0 0.0 - 0.1 K/UL    DF AUTOMATED     METABOLIC PANEL, COMPREHENSIVE    Collection Time: 11/13/20  9:35 AM   Result Value Ref Range    Sodium 138 136 - 145 mmol/L    Potassium 4.0 3.5 - 5.5 mmol/L    Chloride 107 100 - 111 mmol/L    CO2 28 21 - 32 mmol/L    Anion gap 3 3.0 - 18 mmol/L    Glucose 86 74 - 99 mg/dL    BUN 10 7.0 - 18 MG/DL    Creatinine 1.17 0.6 - 1.3 MG/DL    BUN/Creatinine ratio 9 (L) 12 - 20      GFR est AA >60 >60 ml/min/1.73m2    GFR est non-AA 50 (L) >60 ml/min/1.73m2    Calcium 8.9 8.5 - 10.1 MG/DL    Bilirubin, total 0.4 0.2 - 1.0 MG/DL    ALT (SGPT) 16 13 - 56 U/L    AST (SGOT) 12 10 - 38 U/L    Alk. phosphatase 125 (H) 45 - 117 U/L    Protein, total 7.5 6.4 - 8.2 g/dL    Albumin 3.4 3.4 - 5.0 g/dL    Globulin 4.1 (H) 2.0 - 4.0 g/dL    A-G Ratio 0.8 0.8 - 1.7     CARDIAC PANEL,(CK, CKMB & TROPONIN)    Collection Time: 11/13/20  9:35 AM   Result Value Ref Range    CK - MB <1.0 <3.6 ng/ml    CK-MB Index  0.0 - 4.0 %     CALCULATION NOT PERFORMED WHEN RESULT IS BELOW LINEAR LIMIT     26 - 192 U/L    Troponin-I, QT <0.02 0.0 - 0.045 NG/ML   TROPONIN I    Collection Time: 11/13/20 12:40 PM   Result Value Ref Range    Troponin-I, QT <0.02 0.0 - 0.045 NG/ML       Radiologic Studies -   No orders to display     No results found. Medications ordered:   Medications   aspirin tablet 325 mg (325 mg Oral Given 11/13/20 1058)         Medical Decision Making   Initial Medical Decision Making and DDx:  Not really suggestive of ACS but she has some risk factors will get delta Trope. She is pain-free at this time. Do not suspect aortic disease PE pneumonia pneumothorax. Most likely musculoskeletal component from her position as this rapidly resolved with change in position. She does not have a stroke syndrome at this time.     We will give aspirin    1:22 PM Pt reevaluated at this time. Discussed results and findings, as well as, diagnosis and plan for discharge. Follow up with doctors/services listed. Return to the emergency department for alarming symptoms. Pt verbalizes understanding and agreement with plan. All questions addressed. Delta troponin negative no alarming findings on other diagnostics, she has minimal pain, thinks it may be indigestion offered Pepcid which she refuses, offered PPI she refuses this as well she just wants to take Tums when she goes home. Emphasized need for primary care follow-up    ED Course: Progress Notes, Reevaluation, and Consults:     Initial EKG is negative for an acute ischemic process. Borderline tachycardic. I am the first provider for this patient. I reviewed the vital signs, available nursing notes, past medical history, past surgical history, family history and social history. Patient Vitals for the past 12 hrs:   Temp Pulse Resp BP SpO2   11/13/20 1230  70  114/77 99 %   11/13/20 1215  74  118/75 98 %   11/13/20 1200  71  121/74 100 %   11/13/20 1145  74  114/70 98 %   11/13/20 1130  78  121/74 97 %   11/13/20 1115  75  103/64 96 %   11/13/20 1100  80  119/66 98 %   11/13/20 1045  76  112/75 97 %   11/13/20 1030  74  117/72 96 %   11/13/20 1015  77  116/70 98 %   11/13/20 1000  84  120/76 98 %   11/13/20 0945  82  128/86 97 %   11/13/20 0930    137/80 97 %   11/13/20 0922     99 %   11/13/20 0913 98.1 °F (36.7 °C) 97 18 (!) 140/84 98 %       Vital Signs-Reviewed the patient's vital signs. Pulse Oximetry Analysis, Cardiac Monitor, 12 lead ekg: No hypoxia on room air  Interpreted by the EP. Records Reviewed: Nursing notes reviewed (Time of Review: 9:35 AM)    Procedures:   Critical Care Time:   Aspirin: (was aspirin given for stroke?)    Diagnosis     Clinical Impression:   1.  Chest pain, unspecified type        Disposition: Discharged      Follow-up Information     Follow up With Specialties Details Why Contact Info    Keila Mckay NP  In 2 days  [address]  [phone]             Patient's Medications   Start Taking    No medications on file   Continue Taking    ASPIRIN DELAYED-RELEASE 81 MG TABLET    Take 1 Tab by mouth daily.    These Medications have changed    No medications on file   Stop Taking    No medications on file     _______________________________    Notes:    Kendra Todd MD using Dragon dictation      _______________________________

## 2020-11-13 NOTE — DISCHARGE INSTRUCTIONS

## 2020-12-23 ENCOUNTER — TELEPHONE (OUTPATIENT)
Dept: FAMILY MEDICINE CLINIC | Age: 45
End: 2020-12-23

## 2020-12-23 ENCOUNTER — VIRTUAL VISIT (OUTPATIENT)
Dept: FAMILY MEDICINE CLINIC | Age: 45
End: 2020-12-23
Payer: COMMERCIAL

## 2020-12-23 DIAGNOSIS — R07.9 CHEST PAIN, UNSPECIFIED TYPE: Primary | ICD-10-CM

## 2020-12-23 DIAGNOSIS — R07.9 CHEST PAIN, UNSPECIFIED TYPE: ICD-10-CM

## 2020-12-23 DIAGNOSIS — Z72.0 TOBACCO ABUSE: ICD-10-CM

## 2020-12-23 DIAGNOSIS — G45.9 TIA (TRANSIENT ISCHEMIC ATTACK): ICD-10-CM

## 2020-12-23 PROCEDURE — 99203 OFFICE O/P NEW LOW 30 MIN: CPT | Performed by: NURSE PRACTITIONER

## 2020-12-23 RX ORDER — IBUPROFEN 200 MG
1 TABLET ORAL EVERY 24 HOURS
Qty: 28 PATCH | Refills: 0 | Status: SHIPPED | OUTPATIENT
Start: 2021-02-04 | End: 2021-02-25 | Stop reason: DRUGHIGH

## 2020-12-23 RX ORDER — NICOTINE 7MG/24HR
1 PATCH, TRANSDERMAL 24 HOURS TRANSDERMAL EVERY 24 HOURS
Qty: 28 PATCH | Refills: 0 | Status: SHIPPED | OUTPATIENT
Start: 2021-02-19 | End: 2021-02-25 | Stop reason: SDUPTHER

## 2020-12-23 RX ORDER — IBUPROFEN 200 MG
TABLET ORAL AS NEEDED
COMMUNITY
End: 2021-10-19

## 2020-12-23 NOTE — PROGRESS NOTES
Andree Ryan was seen by synchronous (real-time) audio-video technology DOXY on 12/23/2020. Consent: Andree Ryan, who was seen by synchronous (real-time) audio-video technology, and/or her healthcare decision maker, is aware that this patient-initiated, Telehealth encounter on 12/23/2020 is a billable service, with coverage as determined by her insurance carrier. She is aware that she may receive a bill and has provided verbal consent to proceed: yes  712  Subjective:     HPI:  Andree Ryan is a 41 y.o. female who was seen for the following:     Presents to establish care with me as PCP. Previous PCP was none. Chest pain:   Started in mid November. Had high stress related to her father's illness, and birth of her grandchild. Waking up with tight chest pain in the middle of her chest. Then she gets numbness and burning down both arms. Went to ER 11/13/20 and was told everything was fine with her EKG and lab work. Occurs now intermittently both at rest during the day. It has happened about 5 times since the initial ER visit. Sometimes the left neck vessel will become raised and pulses when this occurs. States it feels nothing like the heartburn she had previously. History of TIA a few years ago. Told it might be due to stress. She reports recurrent facial droop intermittently since the TIA. Takes aspirin 81 mg daily. She has been a smoker for 20 years. She quit smoking 7 days ago using patches. Review of Systems   Constitutional: Negative for appetite change, diaphoresis, fatigue and unexpected weight change. Eyes: Negative for visual disturbance. Respiratory: Negative for cough, chest tightness and shortness of breath. Cardiovascular: Positive for chest pain. Negative for palpitations and leg swelling. Gastrointestinal: Negative for abdominal distention, abdominal pain, blood in stool, constipation, diarrhea, nausea, rectal pain and vomiting.    Endocrine: Negative for polydipsia, polyphagia and polyuria. Genitourinary: Negative for decreased urine volume, dysuria and frequency. Musculoskeletal: Negative for joint swelling and myalgias. Skin: Negative for rash and wound. Neurological: Positive for numbness. Negative for dizziness, weakness, light-headedness and headaches. Psychiatric/Behavioral: Negative for dysphoric mood and sleep disturbance. The patient is not nervous/anxious. Past Medical History, Past Surgery History, Allergies, Social History, and Family History were reviewed and updated. Patient Active Problem List   Diagnosis Code    TIA (transient ischemic attack) G45.9    Migraine G43.909     Past Medical History:   Diagnosis Date    Migraines     TIA (transient ischemic attack)      Patient Care Team:  Kimmy Villanueva NP as PCP - General (Nurse Practitioner)  Kimmy Villanueva NP as PCP - Amauri Merritt Provider    Past Surgical History:   Procedure Laterality Date    HX [address]    two times    HX HYSTERECTOMY       Family History   Problem Relation Age of Onset    Cancer Other         mother side of family    Diabetes Other         mother side of family    Hypertension Other         mother side of family    Breast Cancer Paternal Grandmother             Breast Cancer Maternal Aunt              Social History     Tobacco Use    Smoking status: Former Smoker     Packs/day: 0.50     Types: Cigarettes     Quit date: 2020     Years since quittin.0    Smokeless tobacco: Never Used   Substance Use Topics    Alcohol use:  Yes     Alcohol/week: 0.8 standard drinks     Types: 1 drink(s) per week     Drinks per session: 1 or 2     Comment: wine occasionally    Drug use: No     Allergies   Allergen Reactions    Banana Nausea Only    Seasonale [Levonorgestrel-Ethinyl Estrad] Runny Nose    Shellfish Derived Nausea Only    Wellbutrin [Bupropion] Other (comments)     Amnesia type symptoms Current Outpatient Medications on File Prior to Visit   Medication Sig Dispense Refill    ibuprofen (MOTRIN) 200 mg tablet Take  by mouth as needed.  aspirin delayed-release 81 mg tablet Take 1 Tab by mouth daily. 30 Tab 0     No current facility-administered medications on file prior to visit. Health Maintenance Due   Topic Date Due    DTaP/Tdap/Td series (1 - Tdap) 05/17/1997    PAP AKA CERVICAL CYTOLOGY  04/24/2016         Objective     PHYSICAL EXAMINATION:    Vital Signs: (As obtained by patient/caregiver at home)   No flowsheet data found. General: Alert, cooperative, no distress   Mental  status: Normal mood, behavior, speech, dress, motor activity, and thought processes, able to follow commands   HENT: Normocephalic, atraumatic   Neck: No visualized mass   Resp: No respiratory distress   Neuro: No gross deficits   Skin: No discoloration or lesions of concern on visible areas   Psychiatric: Normal affect, consistent with stated mood, no evidence of hallucinations     Additional exam findings: none      LABS     TESTS      Assessment and Plan     1. Chest pain, unspecified type  Check ECHO and carotid US. I will refer to cardiology for further work up, possibly a stress test.   - ECHO ADULT COMPLETE; Future  - DUPLEX CAROTID BILATERAL; Future    2. Tobacco abuse  Continue nicoderm. - nicotine (Nicoderm CQ) 14 mg/24 hr patch; 1 Patch by TransDERmal route every twenty-four (24) hours for 28 days. Dispense: 28 Patch; Refill: 0  - nicotine (Nicoderm CQ) 7 mg/24 hr; 1 Patch by TransDERmal route every twenty-four (24) hours for 28 days. Dispense: 28 Patch; Refill: 0    3. TIA (transient ischemic attack)  Continue with daily aspirin 81 mg  - ECHO ADULT COMPLETE; Future  - DUPLEX CAROTID BILATERAL; Future      Follow-up and Dispositions    · Return in about 4 weeks (around 1/20/2021) for follow up, Sooner as needed.              712  We discussed the expected course, resolution and complications of the diagnosis(es) in detail. Medication risks, benefits, costs, interactions, and alternatives were discussed as indicated. I advised her to contact the office if her condition worsens, changes or fails to improve as anticipated. She expressed understanding with the diagnosis(es) and plan. Leodan Benedict is a 41 y.o. female who was evaluated by a video visit encounter for concerns as above. Patient identification was verified prior to start of the visit. A caregiver was present when appropriate. Due to this being a TeleHealth encounter (During [facility]-93 public health emergency), evaluation of the following organ systems was limited: Vitals/Constitutional/EENT/Resp/CV/GI//MS/Neuro/Skin/Heme-Lymph-Imm. Pursuant to the emergency declaration under the [facility]5 waiver authority and the [employer] and [employer] Act, this Virtual  Visit was conducted, with patient's (and/or legal guardian's) consent, to reduce the patient's risk of exposure to COVID-19 and provide necessary medical care. Services were provided through a video synchronous discussion virtually to substitute for in-person clinic visit. Patient was located at home and provider was located at home.       Signed electronically by Stephani De Souza DNP, MIGUELITO-BC

## 2020-12-28 ENCOUNTER — HOSPITAL ENCOUNTER (OUTPATIENT)
Dept: VASCULAR SURGERY | Age: 45
Discharge: HOME | End: 2020-12-28
Attending: NURSE PRACTITIONER
Payer: COMMERCIAL

## 2020-12-28 ENCOUNTER — HOSPITAL ENCOUNTER (OUTPATIENT)
Dept: NON INVASIVE DIAGNOSTICS | Age: 45
Discharge: HOME | End: 2020-12-28
Attending: NURSE PRACTITIONER
Payer: COMMERCIAL

## 2020-12-28 VITALS
SYSTOLIC BLOOD PRESSURE: 113 MMHG | BODY MASS INDEX: 39.99 KG/M2 | DIASTOLIC BLOOD PRESSURE: 75 MMHG | WEIGHT: 270 LBS | HEIGHT: 69 IN

## 2020-12-28 LAB
ECHO AO ASC DIAM: 2.98 CM
ECHO AO ROOT DIAM: 3.29 CM
ECHO IVC PROX: 1.82 CM
ECHO IVC SNIFF: 1.82 CM
ECHO LA MAJOR AXIS: 3.93 CM
ECHO LA MINOR AXIS: 1.67 CM
ECHO LA TO AORTIC ROOT RATIO: 1.19
ECHO LV EDV A2C: 136 ML
ECHO LV EDV A4C: 129.2 ML
ECHO LV EDV BP: 132.5 ML (ref 56–104)
ECHO LV EDV INDEX A4C: 55.1 ML/M2
ECHO LV EDV INDEX BP: 56.5 ML/M2
ECHO LV EDV NDEX A2C: 58 ML/M2
ECHO LV EDV TEICHHOLZ: 0.63 ML
ECHO LV EJECTION FRACTION A2C: 48 %
ECHO LV EJECTION FRACTION A4C: 63 %
ECHO LV EJECTION FRACTION BIPLANE: 55 % (ref 55–100)
ECHO LV ESV A2C: 71.3 ML
ECHO LV ESV A4C: 48.4 ML
ECHO LV ESV BP: 59.6 ML (ref 19–49)
ECHO LV ESV INDEX A2C: 30.4 ML/M2
ECHO LV ESV INDEX A4C: 20.6 ML/M2
ECHO LV ESV INDEX BP: 25.4 ML/M2
ECHO LV ESV TEICHHOLZ: 0.2 ML
ECHO LV INTERNAL DIMENSION DIASTOLIC: 4.84 CM (ref 3.9–5.3)
ECHO LV INTERNAL DIMENSION SYSTOLIC: 3 CM
ECHO LV IVSD: 0.86 CM (ref 0.6–0.9)
ECHO LV MASS 2D: 133.7 G (ref 67–162)
ECHO LV MASS INDEX 2D: 57 G/M2 (ref 43–95)
ECHO LV POSTERIOR WALL DIASTOLIC: 0.79 CM (ref 0.6–0.9)
ECHO LVOT CARDIAC OUTPUT: 17.2 LITER/MINUTE
ECHO LVOT DIAM: 2.27 CM
ECHO LVOT PEAK GRADIENT: 4.2 MMHG
ECHO LVOT PEAK VELOCITY: 103.04 CM/S
ECHO LVOT SV: 90.1 ML
ECHO LVOT SV: 90.1 ML
ECHO LVOT VTI: 22.2 CM
ECHO MV A VELOCITY: 66.93 CM/S
ECHO MV E DECELERATION TIME (DT): 188.5 MS
ECHO MV E VELOCITY: 72.75 CM/S
ECHO MV E/A RATIO: 1.09
ECHO RA MINOR AXIS: 3.47 CM
LVFS 2D: 38.1 %
LVOT MG: 2.24 MMHG
LVOT MV: 0.71 CM/S
LVSV (MOD BI): 29.82 ML
LVSV (MOD SINGLE 4C): 33.08 ML
LVSV (MOD SINGLE): 26.48 ML
LVSV (TEICH): 30.63 ML
MV DEC SLOPE: 3.86

## 2020-12-28 PROCEDURE — 93306 TTE W/DOPPLER COMPLETE: CPT

## 2020-12-28 PROCEDURE — 93880 EXTRACRANIAL BILAT STUDY: CPT

## 2020-12-30 LAB
LEFT CCA DIST DIAS: 18.6 CM/S
LEFT CCA DIST SYS: 52.6 CM/S
LEFT CCA MID DIAS: 25.2 CM/S
LEFT CCA MID SYS: 85.6 CM/S
LEFT CCA PROX DIAS: 30.7 CM/S
LEFT CCA PROX SYS: 97.7 CM/S
LEFT ECA DIAS: 7 CM/S
LEFT ECA SYS: 36.3 CM/S
LEFT ICA DIST DIAS: 37.6 CM/S
LEFT ICA DIST SYS: 73.7 CM/S
LEFT ICA MID DIAS: 35.9 CM/S
LEFT ICA MID SYS: 83.5 CM/S
LEFT ICA PROX DIAS: 32.6 CM/S
LEFT ICA PROX SYS: 70.3 CM/S
LEFT ICA/CCA SYS: 0.9
LEFT SUBCLAVIAN DIAS: 0 CM/S
LEFT SUBCLAVIAN SYS: 87.3 CM/S
LEFT VERTEBRAL DIAS: 20.7 CM/S
LEFT VERTEBRAL SYS: 40.3 CM/S
RIGHT CCA DIST DIAS: 26 CM/S
RIGHT CCA DIST SYS: 67.7 CM/S
RIGHT CCA MID DIAS: 26 CM/S
RIGHT CCA MID SYS: 76.8 CM/S
RIGHT CCA PROX DIAS: 24.7 CM/S
RIGHT CCA PROX SYS: 79.4 CM/S
RIGHT ECA DIAS: 11.6 CM/S
RIGHT ECA SYS: 63.8 CM/S
RIGHT ICA DIST DIAS: 28.6 CM/S
RIGHT ICA DIST SYS: 56 CM/S
RIGHT ICA MID DIAS: 29.9 CM/S
RIGHT ICA MID SYS: 75.5 CM/S
RIGHT ICA PROX DIAS: 27.3 CM/S
RIGHT ICA PROX SYS: 59.9 CM/S
RIGHT ICA/CCA SYS: 1
RIGHT SUBCLAVIAN DIAS: 0 CM/S
RIGHT SUBCLAVIAN SYS: 75.5 CM/S
RIGHT VERTEBRAL DIAS: 19.7 CM/S
RIGHT VERTEBRAL SYS: 41.5 CM/S

## 2021-03-02 DIAGNOSIS — Z72.0 TOBACCO ABUSE: ICD-10-CM

## 2021-03-02 RX ORDER — NICOTINE 7MG/24HR
1 PATCH, TRANSDERMAL 24 HOURS TRANSDERMAL EVERY 24 HOURS
Qty: 28 PATCH | Refills: 0 | Status: SHIPPED | OUTPATIENT
Start: 2021-03-02 | End: 2021-03-30

## 2021-06-25 ENCOUNTER — HOSPITAL ENCOUNTER (OUTPATIENT)
Dept: WOMENS IMAGING | Age: 46
Discharge: HOME | End: 2021-06-25
Payer: COMMERCIAL

## 2021-06-25 DIAGNOSIS — Z12.31 ENCOUNTER FOR SCREENING MAMMOGRAM FOR BREAST CANCER: ICD-10-CM

## 2021-06-25 PROCEDURE — 77067 SCR MAMMO BI INCL CAD: CPT

## 2021-08-17 ENCOUNTER — OFFICE VISIT (OUTPATIENT)
Dept: FAMILY MEDICINE CLINIC | Age: 46
End: 2021-08-17
Payer: COMMERCIAL

## 2021-08-17 VITALS
RESPIRATION RATE: 20 BRPM | WEIGHT: 293 LBS | OXYGEN SATURATION: 99 % | BODY MASS INDEX: 43.4 KG/M2 | SYSTOLIC BLOOD PRESSURE: 131 MMHG | HEART RATE: 91 BPM | HEIGHT: 69 IN | TEMPERATURE: 98 F | DIASTOLIC BLOOD PRESSURE: 84 MMHG

## 2021-08-17 DIAGNOSIS — Z76.89 ENCOUNTER TO ESTABLISH CARE: ICD-10-CM

## 2021-08-17 DIAGNOSIS — G43.819 OTHER MIGRAINE WITHOUT STATUS MIGRAINOSUS, INTRACTABLE: Primary | ICD-10-CM

## 2021-08-17 DIAGNOSIS — R51.9 INTRACTABLE HEADACHE, UNSPECIFIED CHRONICITY PATTERN, UNSPECIFIED HEADACHE TYPE: ICD-10-CM

## 2021-08-17 DIAGNOSIS — Z00.00 ENCOUNTER FOR ROUTINE ADULT HEALTH EXAMINATION WITHOUT ABNORMAL FINDINGS: ICD-10-CM

## 2021-08-17 PROCEDURE — 99214 OFFICE O/P EST MOD 30 MIN: CPT | Performed by: STUDENT IN AN ORGANIZED HEALTH CARE EDUCATION/TRAINING PROGRAM

## 2021-08-17 RX ORDER — AMITRIPTYLINE HYDROCHLORIDE 10 MG/1
10 TABLET, FILM COATED ORAL
Qty: 30 TABLET | Refills: 0 | Status: SHIPPED | OUTPATIENT
Start: 2021-08-17 | End: 2021-09-09

## 2021-08-17 NOTE — PROGRESS NOTES
Storm Pedroza presents today for   Chief Complaint   Patient presents with   [facility].       Is someone accompanying this pt? no    Is the patient using any DME equipment during OV? no    Depression Screening:  3 most recent PHQ Screens 8/17/2021   Little interest or pleasure in doing things Not at all   Feeling down, depressed, irritable, or hopeless Not at all   Total Score PHQ 2 0       Learning Assessment:  Learning Assessment 4/2/2015   PRIMARY LEARNER Patient   CO-LEARNER HIGHEST LEVEL OF EDUCATION 2 YEARS OF COLLEGE   PRIMARY LANGUAGE ENGLISH   LEARNER PREFERENCE PRIMARY DEMONSTRATION   ANSWERED BY patient   RELATIONSHIP SELF       Abuse Screening:  No flowsheet data found. Fall Risk  No flowsheet data found. Health Maintenance reviewed and discussed and ordered per Provider. Health Maintenance Due   Topic Date Due    Hepatitis C Screening  Never done    COVID-19 Vaccine (1) Never done    DTaP/Tdap/Td series (1 - Tdap) Never done    PAP AKA CERVICAL CYTOLOGY  04/24/2016    Colorectal Cancer Screening Combo  Never done   . Coordination of Care:  1. Have you been to the ER, urgent care clinic since your last visit? Hospitalized since your last visit? no    2. Have you seen or consulted any other health care providers outside of the [address] [place] [address] since your last visit? Include any pap smears or colon screening.  no      Last  Checked na  Last UDS Checked na  Last Pain contract signed: corey

## 2021-08-17 NOTE — PROGRESS NOTES
Richard Sims is a 40 y.o.  female and presents with    Chief Complaint   Patient presents with    Establish Care           Subjective:  Pt is here to establish care. Previous pt of NP Halima. Has SALAZAR 3-5 days then migraine hits mainly the frontal area. +photophobia + phonophobia, +nausea. CBD oil helps. When she has the HA she is able to work, however when her migraine strikes she is gets debilitated. Pt feels that heat tends to exacerbate the symptoms. Patient Active Problem List   Diagnosis Code    TIA (transient ischemic attack) G45.9    Migraine G43.909      Past Medical History:   Diagnosis Date    Migraines     TIA (transient ischemic attack)       Past Surgical History:   Procedure Laterality Date    HX [address]    two times    HX HYSTERECTOMY        Family History   Problem Relation Age of Onset    Cancer Other         mother side of family    Diabetes Other         mother side of family    Hypertension Other         mother side of family    Breast Cancer Paternal Grandmother             Breast Cancer Maternal Aunt              Social History     Socioeconomic History    Marital status: SINGLE     Spouse name: Not on file    Number of children: Not on file    Years of education: Not on file    Highest education level: Not on file   Occupational History    Occupation:  for home health care   Tobacco Use    Smoking status: Former Smoker     Packs/day: 0.50     Types: Cigarettes     Quit date: 2020     Years since quittin.6    Smokeless tobacco: Never Used   Vaping Use    Vaping Use: Some days    Substances: Flavoring, Enjoy   Substance and Sexual Activity    Alcohol use:  Yes     Alcohol/week: 0.8 standard drinks     Types: 1 drink(s) per week     Comment: wine occasionally    Drug use: No    Sexual activity: Not on file   Other Topics Concern    Not on file   Social History Narrative    Not on file     Social Determinants of Health     Financial Resource Strain:     Difficulty of Paying Living Expenses:    Food Insecurity:     Worried About Running Out of Food in the Last Year:     [address] in the Last Year:    Transportation Needs:     Lack of Transportation (Medical):  Lack of Transportation (Non-Medical):    Physical Activity:     Days of Exercise per Week:     Minutes of Exercise per Session:    Stress:     Feeling of Stress :    Social Connections:     Frequency of Communication with Friends and Family:     Frequency of Social Gatherings with Friends and Family:     Attends [employer] Services:     Active Member of Clubs or Organizations:     Attends Club or Organization Meetings:     Marital Status:    Intimate Partner Violence:     Fear of Current or Ex-Partner:     Emotionally Abused:     Physically Abused:     Sexually Abused:         Current Outpatient Medications   Medication Sig Dispense Refill    ibuprofen (MOTRIN) 200 mg tablet Take  by mouth as needed. (Patient not taking: Reported on 8/17/2021)      aspirin delayed-release 81 mg tablet Take 1 Tab by mouth daily. 30 Tab 0        ROS   Review of Systems   Constitutional: Negative for chills, fever and malaise/fatigue. HENT: Negative for congestion, ear discharge and ear pain. Eyes: Negative for blurred vision, pain and discharge. Respiratory: Negative for cough and shortness of breath. Cardiovascular: Negative for chest pain and palpitations. Gastrointestinal: Negative for abdominal pain, nausea and vomiting. Genitourinary: Negative for dysuria, frequency and urgency. Skin: Negative for itching and rash. Neurological: Negative for dizziness, seizures, loss of consciousness and headaches. Psychiatric/Behavioral: Negative for substance abuse.            Objective:  Vitals:    08/17/21 1525   BP: 131/84   Pulse: 91   Resp: 20   Temp: 98 °F (36.7 °C)   SpO2: 99%   Weight: 307 lb 6.4 oz (139.4 kg)   Height: 5' 9\" (1.753 m)   PainSc:   8   PainLoc: Head       Physical Exam  Vitals reviewed. Constitutional:       Appearance: Normal appearance. She is obese. HENT:      Right Ear: Tympanic membrane, ear canal and external ear normal. There is no impacted cerumen. Left Ear: Tympanic membrane, ear canal and external ear normal. There is no impacted cerumen. Nose: Nose normal. No congestion or rhinorrhea. Mouth/Throat:      Mouth: Mucous membranes are dry. Pharynx: Oropharynx is clear. No oropharyngeal exudate or posterior oropharyngeal erythema. Eyes:      General: No scleral icterus. Right eye: No discharge. Left eye: No discharge. Extraocular Movements: Extraocular movements intact. Cardiovascular:      Rate and Rhythm: Normal rate and regular rhythm. Heart sounds: No murmur heard. Pulmonary:      Effort: Pulmonary effort is normal. No respiratory distress. Breath sounds: Normal breath sounds. No stridor. No wheezing, rhonchi or rales. Chest:      Chest wall: No tenderness. Abdominal:      General: Abdomen is flat. Bowel sounds are normal.      Palpations: Abdomen is soft. Tenderness: There is no abdominal tenderness. Musculoskeletal:         General: No swelling, tenderness, deformity or signs of injury. Normal range of motion. Cervical back: Normal range of motion and neck supple. Right lower leg: No edema. Left lower leg: No edema. Lymphadenopathy:      Cervical: No cervical adenopathy. Skin:     General: Skin is warm and dry. Neurological:      Mental Status: She is alert and oriented to person, place, and time. Cranial Nerves: No cranial nerve deficit. Deep Tendon Reflexes: Reflexes normal.   Psychiatric:         Mood and Affect: Mood normal.         Behavior: Behavior normal.         Thought Content: Thought content normal.           LABS     TESTS      Assessment/Plan:    1.  Other migraine without status migrainosus, intractable  - amitriptyline (ELAVIL) 10 mg tablet; Take 1 Tablet by mouth nightly. Dispense: 30 Tablet; Refill: 0    2. Intractable headache, unspecified chronicity pattern, unspecified headache type  - amitriptyline (ELAVIL) 10 mg tablet; Take 1 Tablet by mouth nightly. Dispense: 30 Tablet; Refill: 0    3. Encounter for routine adult health examination without abnormal findings  - TSH 3RD GENERATION; Future  - HEMOGLOBIN A1C WITH EAG; Future  - METABOLIC PANEL, COMPREHENSIVE; Future  - CBC WITH AUTOMATED DIFF; Future  - LIPID PANEL; Future    4. Encounter to establish care  Will be pt PCP    Lab review: orders written for new lab studies as appropriate; see orders      I have discussed the diagnosis with the patient and the intended plan as seen in the above orders. The patient has received an after-visit summary and questions were answered concerning future plans. I have discussed medication side effects and warnings with the patient as well. I have reviewed the plan of care with the patient, accepted their input and they are in agreement with the treatment goals.          Kalpesh Claire MD

## 2021-08-26 ENCOUNTER — HOSPITAL ENCOUNTER (OUTPATIENT)
Dept: LAB | Age: 46
Discharge: HOME | End: 2021-08-26

## 2021-08-26 LAB — SENTARA SPECIMEN COL,SENBCF: NORMAL

## 2021-08-26 PROCEDURE — 99001 SPECIMEN HANDLING PT-LAB: CPT

## 2021-08-27 LAB
A-G RATIO,AGRAT: 1.5 RATIO (ref 1.1–2.6)
ABSOLUTE LYMPHOCYTE COUNT, 10803: 2.7 K/UL (ref 1–4.8)
ALBUMIN SERPL-MCNC: 4 G/DL (ref 3.5–5)
ALP SERPL-CCNC: 138 U/L (ref 25–115)
ALT SERPL-CCNC: 12 U/L (ref 5–40)
ANION GAP SERPL CALC-SCNC: 12 MMOL/L (ref 3–15)
AST SERPL W P-5'-P-CCNC: 14 U/L (ref 10–37)
AVG GLU, 10930: 118 MG/DL (ref 91–123)
BASOPHILS # BLD: 0 K/UL (ref 0–0.2)
BASOPHILS NFR BLD: 0 % (ref 0–2)
BILIRUB SERPL-MCNC: 0.4 MG/DL (ref 0.2–1.2)
BUN SERPL-MCNC: 12 MG/DL (ref 6–22)
CALCIUM SERPL-MCNC: 9.2 MG/DL (ref 8.4–10.5)
CHLORIDE SERPL-SCNC: 103 MMOL/L (ref 98–110)
CHOLEST SERPL-MCNC: 185 MG/DL (ref 110–200)
CO2 SERPL-SCNC: 25 MMOL/L (ref 20–32)
CREAT SERPL-MCNC: 1.2 MG/DL (ref 0.5–1.2)
EOSINOPHIL # BLD: 0.3 K/UL (ref 0–0.5)
EOSINOPHIL NFR BLD: 3 % (ref 0–6)
ERYTHROCYTE [DISTWIDTH] IN BLOOD BY AUTOMATED COUNT: 13.2 % (ref 10–15.5)
GFRAA, 66117: 56.7
GFRNA, 66118: 46.8
GLOBULIN,GLOB: 2.7 G/DL (ref 2–4)
GLUCOSE SERPL-MCNC: 87 MG/DL (ref 70–99)
GRANULOCYTES,GRANS: 65 % (ref 40–75)
HBA1C MFR BLD HPLC: 5.7 % (ref 4.8–5.6)
HCT VFR BLD AUTO: 48.8 % (ref 35.1–48)
HDLC SERPL-MCNC: 36 MG/DL
HDLC SERPL-MCNC: 5.1 MG/DL (ref 0–5)
HGB BLD-MCNC: 14.8 G/DL (ref 11.7–16)
LDL/HDL RATIO,LDHD: 3.4
LDLC SERPL CALC-MCNC: 122 MG/DL (ref 50–99)
LYMPHOCYTES, LYMLT: 25 % (ref 20–45)
MCH RBC QN AUTO: 28 PG (ref 26–34)
MCHC RBC AUTO-ENTMCNC: 30 G/DL (ref 31–36)
MCV RBC AUTO: 92 FL (ref 81–99)
MONOCYTES # BLD: 0.7 K/UL (ref 0.1–1)
MONOCYTES NFR BLD: 6 % (ref 3–12)
NEUTROPHILS # BLD AUTO: 6.8 K/UL (ref 1.8–7.7)
NON-HDL CHOLESTEROL, 011976: 149 MG/DL
PLATELET # BLD AUTO: 205 K/UL (ref 140–440)
PMV BLD AUTO: 10.9 FL (ref 9–13)
POTASSIUM SERPL-SCNC: 4.6 MMOL/L (ref 3.5–5.5)
PROT SERPL-MCNC: 6.7 G/DL (ref 6.4–8.3)
RBC # BLD AUTO: 5.31 M/UL (ref 3.8–5.2)
SODIUM SERPL-SCNC: 140 MMOL/L (ref 133–145)
TRIGL SERPL-MCNC: 135 MG/DL (ref 40–149)
TSH SERPL DL<=0.005 MIU/L-ACNC: 2.39 MCU/ML (ref 0.27–4.2)
VLDLC SERPL CALC-MCNC: 27 MG/DL (ref 8–30)
WBC # BLD AUTO: 10.5 K/UL (ref 4–11)

## 2021-09-09 ENCOUNTER — OFFICE VISIT (OUTPATIENT)
Dept: FAMILY MEDICINE CLINIC | Age: 46
End: 2021-09-09
Payer: COMMERCIAL

## 2021-09-09 VITALS
HEART RATE: 97 BPM | SYSTOLIC BLOOD PRESSURE: 138 MMHG | BODY MASS INDEX: 46.16 KG/M2 | OXYGEN SATURATION: 97 % | TEMPERATURE: 97.8 F | RESPIRATION RATE: 20 BRPM | DIASTOLIC BLOOD PRESSURE: 98 MMHG | WEIGHT: 293 LBS

## 2021-09-09 DIAGNOSIS — G43.819 OTHER MIGRAINE WITHOUT STATUS MIGRAINOSUS, INTRACTABLE: Primary | ICD-10-CM

## 2021-09-09 DIAGNOSIS — E78.2 MIXED HYPERLIPIDEMIA: ICD-10-CM

## 2021-09-09 DIAGNOSIS — G43.819 OTHER MIGRAINE WITHOUT STATUS MIGRAINOSUS, INTRACTABLE: ICD-10-CM

## 2021-09-09 DIAGNOSIS — R73.03 PREDIABETES: ICD-10-CM

## 2021-09-09 DIAGNOSIS — R51.9 INTRACTABLE HEADACHE, UNSPECIFIED CHRONICITY PATTERN, UNSPECIFIED HEADACHE TYPE: ICD-10-CM

## 2021-09-09 PROCEDURE — 99214 OFFICE O/P EST MOD 30 MIN: CPT | Performed by: STUDENT IN AN ORGANIZED HEALTH CARE EDUCATION/TRAINING PROGRAM

## 2021-09-09 RX ORDER — PROPRANOLOL HYDROCHLORIDE 40 MG/1
40 TABLET ORAL DAILY
Qty: 30 TABLET | Refills: 0 | Status: SHIPPED | OUTPATIENT
Start: 2021-09-09 | End: 2021-09-28 | Stop reason: DRUGHIGH

## 2021-09-09 RX ORDER — AMITRIPTYLINE HYDROCHLORIDE 10 MG/1
TABLET, FILM COATED ORAL
Qty: 30 TABLET | Refills: 0 | Status: SHIPPED | OUTPATIENT
Start: 2021-09-09 | End: 2021-09-09

## 2021-09-09 NOTE — PROGRESS NOTES
Opal Flower presents today for   Chief Complaint   Patient presents with    Follow-up       Is someone accompanying this pt? no    Is the patient using any DME equipment during OV? no    Depression Screening:  3 most recent PHQ Screens 9/9/2021   Little interest or pleasure in doing things Not at all   Feeling down, depressed, irritable, or hopeless Not at all   Total Score PHQ 2 0       Learning Assessment:  Learning Assessment 4/2/2015   PRIMARY LEARNER Patient   R Rachell Morejon 76 2 YEARS OF COLLEGE   PRIMARY LANGUAGE ENGLISH   LEARNER PREFERENCE PRIMARY DEMONSTRATION   ANSWERED BY patient   RELATIONSHIP SELF       Abuse Screening:  Abuse Screening Questionnaire 8/17/2021   Do you ever feel afraid of your partner? N   Are you in a relationship with someone who physically or mentally threatens you? N   Is it safe for you to go home? Y       Fall Risk  No flowsheet data found. Health Maintenance reviewed and discussed and ordered per Provider. Health Maintenance Due   Topic Date Due    Hepatitis C Screening  Never done    COVID-19 Vaccine (1) Never done    DTaP/Tdap/Td series (1 - Tdap) Never done    Colorectal Cancer Screening Combo  Never done    Flu Vaccine (1) 09/01/2021   . Coordination of Care:  1. Have you been to the ER, urgent care clinic since your last visit? Hospitalized since your last visit? no    2. Have you seen or consulted any other health care providers outside of the [address] [place] [address] since your last visit? Include any pap smears or colon screening.  no      Last  Checked na  Last UDS Checked na  Last Pain contract signed: na

## 2021-09-12 NOTE — PROGRESS NOTES
Hannah Javier is a 40 y.o.  female and presents with    Chief Complaint   Patient presents with    Follow-up           Subjective:  Patient states that with the amitriptyline she has been having no headaches, however this has been too strong for her and she feels all the time tired. Also here to go over her test results. Patient Active Problem List   Diagnosis Code    TIA (transient ischemic attack) G45.9    Migraine G43.909      Past Medical History:   Diagnosis Date    Migraines     TIA (transient ischemic attack)       Past Surgical History:   Procedure Laterality Date    [address]X [address]    two times    HX HYSTERECTOMY        Family History   Problem Relation Age of Onset    Cancer Other         mother side of family    Diabetes Other         mother side of family    Hypertension Other         mother side of family    Breast Cancer Paternal Grandmother             Breast Cancer Maternal Aunt              Social History     Socioeconomic History    Marital status: SINGLE     Spouse name: Not on file    Number of children: Not on file    Years of education: Not on file    Highest education level: Not on file   Occupational History    Occupation:  for home health care   Tobacco Use    Smoking status: Former Smoker     Packs/day: 0.50     Types: Cigarettes     Quit date: 2020     Years since quittin.7    Smokeless tobacco: Never Used   Vaping Use    Vaping Use: Some days    Substances: Flavoring, Enjoy   Substance and Sexual Activity    Alcohol use:  Yes     Alcohol/week: 0.8 standard drinks     Types: 1 drink(s) per week     Comment: wine occasionally    Drug use: No    Sexual activity: Not on file   Other Topics Concern    Not on file   Social History Narrative    Not on file     Social Determinants of Health     Financial Resource Strain:     Difficulty of Paying Living Expenses:    Food Insecurity:     Worried About [address] [facility] in the Last Year:    [address] in the Last Year:    Transportation Needs:     Lack of Transportation (Medical):  Lack of Transportation (Non-Medical):    Physical Activity:     Days of Exercise per Week:     Minutes of Exercise per Session:    Stress:     Feeling of Stress :    Social Connections:     Frequency of Communication with Friends and Family:     Frequency of Social Gatherings with Friends and Family:     Attends Latter day Services:     Active Member of Clubs or Organizations:     Attends Club or Organization Meetings:     Marital Status:    Intimate Partner Violence:     Fear of Current or Ex-Partner:     Emotionally Abused:     Physically Abused:     Sexually Abused:         Current Outpatient Medications   Medication Sig Dispense Refill    propranoloL (INDERAL) 40 mg tablet Take 1 Tablet by mouth daily. 30 Tablet 0    ibuprofen (MOTRIN) 200 mg tablet Take  by mouth as needed.  aspirin delayed-release 81 mg tablet Take 1 Tab by mouth daily. 30 Tab 0        ROS   Review of Systems   Constitutional: Negative for chills, fever and malaise/fatigue. HENT: Negative for congestion, ear discharge and ear pain. Eyes: Negative for blurred vision, pain and discharge. Respiratory: Negative for cough and shortness of breath. Cardiovascular: Negative for chest pain and palpitations. Gastrointestinal: Negative for abdominal pain, nausea and vomiting. Genitourinary: Negative for dysuria, frequency and urgency. Skin: Negative for itching and rash. Neurological: Negative for dizziness, seizures, loss of consciousness and headaches. Psychiatric/Behavioral: Negative for substance abuse. Objective:  Vitals:    09/09/21 1152 09/09/21 1158   BP: (!) 148/105 (!) 138/98   Pulse: 97 97   Resp: 20    Temp: 97.8 °F (36.6 °C)    SpO2: 97%    Weight: 312 lb 9.6 oz (141.8 kg)    PainSc:   0 - No pain        Physical Exam  Vitals reviewed. Constitutional:       Appearance: Normal appearance. She is obese. Eyes:      General: No scleral icterus. Right eye: No discharge. Left eye: No discharge. Cardiovascular:      Rate and Rhythm: Normal rate and regular rhythm. Pulses: Normal pulses. Pulmonary:      Effort: Pulmonary effort is normal. No respiratory distress. Breath sounds: Normal breath sounds. Musculoskeletal:      Cervical back: Normal range of motion and neck supple. Neurological:      Mental Status: She is alert and oriented to person, place, and time. Cranial Nerves: No cranial nerve deficit. Psychiatric:         Mood and Affect: Mood normal.         Behavior: Behavior normal.         Thought Content: Thought content normal.         Judgment: Judgment normal.           LABS     TESTS      Assessment/Plan:    1. Other migraine without status migrainosus, intractable  Will switch from amitriptyline to propanolol  - propranoloL (INDERAL) 40 mg tablet; Take 1 Tablet by mouth daily. Dispense: 30 Tablet; Refill: 0    2. Mixed hyperlipidemia  - REFERRAL TO NUTRITION    3. Prediabetes  - REFERRAL TO NUTRITION       Lab review: labs reviewed, I note that glycosylated hemoglobin normal, mildly abnormal but acceptable, lipids LDL result does not yet meet goal, HDL low, triglycerides normal, liver functions are normal, renal functions normal, mildly abnormal but acceptable      I have discussed the diagnosis with the patient and the intended plan as seen in the above orders. The patient has received an after-visit summary and questions were answered concerning future plans. I have discussed medication side effects and warnings with the patient as well. I have reviewed the plan of care with the patient, accepted their input and they are in agreement with the treatment goals.          Abby Castleman, MD

## 2021-09-28 ENCOUNTER — VIRTUAL VISIT (OUTPATIENT)
Dept: FAMILY MEDICINE CLINIC | Age: 46
End: 2021-09-28
Payer: COMMERCIAL

## 2021-09-28 DIAGNOSIS — G43.819 OTHER MIGRAINE WITHOUT STATUS MIGRAINOSUS, INTRACTABLE: Primary | ICD-10-CM

## 2021-09-28 PROCEDURE — 99213 OFFICE O/P EST LOW 20 MIN: CPT | Performed by: STUDENT IN AN ORGANIZED HEALTH CARE EDUCATION/TRAINING PROGRAM

## 2021-09-28 RX ORDER — PROPRANOLOL HYDROCHLORIDE 60 MG/1
60 CAPSULE, EXTENDED RELEASE ORAL DAILY
Qty: 30 CAPSULE | Refills: 0 | Status: SHIPPED | OUTPATIENT
Start: 2021-09-28 | End: 2021-10-19 | Stop reason: SDUPTHER

## 2021-09-28 NOTE — PROGRESS NOTES
Yue Joshi is a 40 y.o.  female and presents with    Chief Complaint   Patient presents with    Headache       Yue Joshi, who was evaluated through a synchronous (real-time) audio-video encounter, and/or her healthcare decision maker, is aware that it is a billable service, with coverage as determined by her insurance carrier. She provided verbal consent to proceed: Yes, and patient identification was verified. This visit was conducted pursuant to the emergency declaration under the [address] [place] [address] authority and the [employer] Act. A caregiver was present when appropriate. Ability to conduct physical exam was limited. The patient was located in a state where the provider was credentialed to provide care. --Antonio Walters MD on 2021 at 1:26 PM      Subjective:    Last visit pt as switched from amitriptyline to propanolol. Went back to having HA, however they were not as frequent as previously. She does not have any longer the symptoms from feeling tired due to the amitriptyline. States she had headache last week lasted 3 days and was 5/10. Had another HA 2 days agowas 8/10 and took Ibuprofen x2 and this helped.     Patient Active Problem List   Diagnosis Code    TIA (transient ischemic attack) G45.9    Migraine G43.909      Past Medical History:   Diagnosis Date    Migraines     TIA (transient ischemic attack)       Past Surgical History:   Procedure Laterality Date    [address]X [address]    two times    HX HYSTERECTOMY        Family History   Problem Relation Age of Onset    Cancer Other         mother side of family    Diabetes Other         mother side of family    Hypertension Other         mother side of family    Breast Cancer Paternal Grandmother             Breast Cancer Maternal Aunt              Social History     Socioeconomic History    Marital status: SINGLE     Spouse name: Not on file    Number of children: Not on file    Years of education: Not on file    Highest education level: Not on file   Occupational History    Occupation:  for home health care   Tobacco Use    Smoking status: Former Smoker     Packs/day: 0.50     Types: Cigarettes     Quit date: 2020     Years since quittin.7    Smokeless tobacco: Never Used   Vaping Use    Vaping Use: Some days    Substances: Flavoring, Enjoy   Substance and Sexual Activity    Alcohol use: Yes     Alcohol/week: 0.8 standard drinks     Types: 1 drink(s) per week     Comment: wine occasionally    Drug use: No    Sexual activity: Not on file   Other Topics Concern    Not on file   Social History Narrative    Not on file     Social Determinants of Health     Financial Resource Strain:     Difficulty of Paying Living Expenses:    Food Insecurity:     Worried About Running Out of Food in the Last Year:     [address] in the Last Year:    Transportation Needs:     Lack of Transportation (Medical):  Lack of Transportation (Non-Medical):    Physical Activity:     Days of Exercise per Week:     Minutes of Exercise per Session:    Stress:     Feeling of Stress :    Social Connections:     Frequency of Communication with Friends and Family:     Frequency of Social Gatherings with Friends and Family:     Attends Yarsanism Services:     Active Member of Clubs or Organizations:     Attends Club or Organization Meetings:     Marital Status:    Intimate Partner Violence:     Fear of Current or Ex-Partner:     Emotionally Abused:     Physically Abused:     Sexually Abused:         Current Outpatient Medications   Medication Sig Dispense Refill    propranolol LA (INDERAL LA) 60 mg SR capsule Take 1 Capsule by mouth daily. 30 Capsule 0    ibuprofen (MOTRIN) 200 mg tablet Take  by mouth as needed.       aspirin delayed-release 81 mg tablet Take 1 Tab by mouth daily. 30 Tab 0        ROS   Review of Systems   Constitutional: Negative for chills, fever and malaise/fatigue. HENT: Negative for congestion, ear discharge and ear pain. Eyes: Negative for blurred vision, pain and discharge. Respiratory: Negative for cough and shortness of breath. Cardiovascular: Negative for chest pain and palpitations. Gastrointestinal: Negative for abdominal pain, nausea and vomiting. Genitourinary: Negative for dysuria, frequency and urgency. Skin: Negative for itching and rash. Neurological: Positive for headaches. Negative for dizziness, seizures and loss of consciousness. Psychiatric/Behavioral: Negative for substance abuse. Objective: There were no vitals filed for this visit. Physical Exam  Constitutional:       Appearance: Normal appearance. Eyes:      General: No scleral icterus. Right eye: No discharge. Left eye: No discharge. Pulmonary:      Effort: Pulmonary effort is normal. No respiratory distress. Musculoskeletal:      Cervical back: Normal range of motion and neck supple. Neurological:      Mental Status: She is alert and oriented to person, place, and time. Cranial Nerves: No cranial nerve deficit. Psychiatric:         Mood and Affect: Mood normal.         Behavior: Behavior normal.         Thought Content: Thought content normal.         Judgment: Judgment normal.           LABS     TESTS      Assessment/Plan:    1. Other migraine without status migrainosus, intractable  Increased from 40mg to 60mg  - propranolol LA (INDERAL LA) 60 mg SR capsule; Take 1 Capsule by mouth daily. Dispense: 30 Capsule; Refill: 0    Lab review: no lab studies available for review at time of visit      I have discussed the diagnosis with the patient and the intended plan as seen in the above orders. The patient has received an after-visit summary and questions were answered concerning future plans.   I have discussed medication side effects and warnings with the patient as well. I have reviewed the plan of care with the patient, accepted their input and they are in agreement with the treatment goals.          Felipe Rahman MD

## 2021-10-19 ENCOUNTER — VIRTUAL VISIT (OUTPATIENT)
Dept: FAMILY MEDICINE CLINIC | Age: 46
End: 2021-10-19
Payer: COMMERCIAL

## 2021-10-19 ENCOUNTER — HOSPITAL ENCOUNTER (OUTPATIENT)
Dept: NUTRITION | Age: 46
Discharge: HOME | End: 2021-10-19
Payer: COMMERCIAL

## 2021-10-19 DIAGNOSIS — G43.819 OTHER MIGRAINE WITHOUT STATUS MIGRAINOSUS, INTRACTABLE: ICD-10-CM

## 2021-10-19 PROCEDURE — 99213 OFFICE O/P EST LOW 20 MIN: CPT | Performed by: STUDENT IN AN ORGANIZED HEALTH CARE EDUCATION/TRAINING PROGRAM

## 2021-10-19 PROCEDURE — 97802 MEDICAL NUTRITION INDIV IN: CPT

## 2021-10-19 RX ORDER — PROPRANOLOL HYDROCHLORIDE 60 MG/1
60 CAPSULE, EXTENDED RELEASE ORAL DAILY
Qty: 90 CAPSULE | Refills: 0 | Status: SHIPPED | OUTPATIENT
Start: 2021-10-19 | End: 2022-01-14

## 2021-10-19 NOTE — PROGRESS NOTES
Grecia Newberry is a 40 y.o.  female and presents with    Chief Complaint   Patient presents with   Margaux Calix, who was evaluated through a synchronous (real-time) audio only encounter, and/or her healthcare decision maker, is aware that it is a billable service, with coverage as determined by her insurance carrier. She provided verbal consent to proceed: Yes, and patient identification was verified. This visit was conducted pursuant to the emergency declaration under the [facility], [address] [place] [address] authority and the [employer] Act. A caregiver was present when appropriate. Ability to conduct physical exam was limited. The patient was located in a state where the provider was credentialed to provide care. --Emiliano Lopez MD on 10/19/2021 at 4:28 PM        Subjective:    Patient states that since she has been on the propanolol at the new dose she feels like she has had no longer headaches. Denies any side effect from medication. Feels also the medicine has helped her with some anxiety like overcoming anxiety of getting on a plane. She states she is doing well on this at this time.     Patient Active Problem List   Diagnosis Code    TIA (transient ischemic attack) G45.9    Migraine G43.909      Past Medical History:   Diagnosis Date    Migraines     TIA (transient ischemic attack)       Past Surgical History:   Procedure Laterality Date    HX [address]    two times    HX HYSTERECTOMY        Family History   Problem Relation Age of Onset    Cancer Other         mother side of family    Diabetes Other         mother side of family    Hypertension Other         mother side of family    Breast Cancer Paternal Grandmother             Breast Cancer Maternal Aunt              Social History     Socioeconomic History    Marital status: SINGLE Spouse name: Not on file    Number of children: Not on file    Years of education: Not on file    Highest education level: Not on file   Occupational History    Occupation:  for home health care   Tobacco Use    Smoking status: Former Smoker     Packs/day: 0.50     Types: Cigarettes     Quit date: 2020     Years since quittin.8    Smokeless tobacco: Never Used   Vaping Use    Vaping Use: Some days    Substances: Flavoring, Enjoy   Substance and Sexual Activity    Alcohol use: Yes     Alcohol/week: 0.8 standard drinks     Types: 1 drink(s) per week     Comment: wine occasionally    Drug use: No    Sexual activity: Not on file   Other Topics Concern    Not on file   Social History Narrative    Not on file     Social Determinants of Health     Financial Resource Strain:     Difficulty of Paying Living Expenses:    Food Insecurity:     Worried About Running Out of Food in the Last Year:     [address] in the Last Year:    Transportation Needs:     Lack of Transportation (Medical):  Lack of Transportation (Non-Medical):    Physical Activity:     Days of Exercise per Week:     Minutes of Exercise per Session:    Stress:     Feeling of Stress :    Social Connections:     Frequency of Communication with Friends and Family:     Frequency of Social Gatherings with Friends and Family:     Attends Zoroastrian Services:     Active Member of Clubs or Organizations:     Attends Club or Organization Meetings:     Marital Status:    Intimate Partner Violence:     Fear of Current or Ex-Partner:     Emotionally Abused:     Physically Abused:     Sexually Abused:         Current Outpatient Medications   Medication Sig Dispense Refill    propranolol LA (INDERAL LA) 60 mg SR capsule Take 1 Capsule by mouth daily. 30 Capsule 0    ibuprofen (MOTRIN) 200 mg tablet Take  by mouth as needed.  (Patient not taking: Reported on 10/19/2021)      aspirin delayed-release 81 mg tablet Take 1 Tab by mouth daily. (Patient not taking: Reported on 10/19/2021) 30 Tab 0        ROS   Review of Systems   Constitutional: Negative for chills, fever and malaise/fatigue. HENT: Negative for congestion, ear discharge and ear pain. Eyes: Negative for blurred vision, pain and discharge. Respiratory: Negative for cough and shortness of breath. Cardiovascular: Negative for chest pain and palpitations. Gastrointestinal: Negative for abdominal pain, nausea and vomiting. Genitourinary: Negative for dysuria, frequency and urgency. Skin: Negative for itching and rash. Neurological: Negative for dizziness, seizures, loss of consciousness and headaches. Psychiatric/Behavioral: Negative for substance abuse. Objective: There were no vitals filed for this visit. Physical Exam  Pulmonary:      Effort: Pulmonary effort is normal. No respiratory distress. Neurological:      Mental Status: She is alert and oriented to person, place, and time. Psychiatric:         Mood and Affect: Mood normal.         Behavior: Behavior normal.         Thought Content: Thought content normal.         Judgment: Judgment normal.           LABS     TESTS      Assessment/Plan:    1. Other migraine without status migrainosus, intractable  Stable we will continue present management. - propranolol LA (INDERAL LA) 60 mg SR capsule; Take 1 Capsule by mouth daily. Dispense: 90 Capsule; Refill: 0    Lab review: no lab studies available for review at time of visit    This visit was conducted via audio due to not being able to have patient log onto virtual on [email or web address]. Appointment took approximately 15 minutes. I have discussed the diagnosis with the patient and the intended plan as seen in the above orders. The patient has received an after-visit summary and questions were answered concerning future plans. I have discussed medication side effects and warnings with the patient as well.  I have reviewed the plan of care with the patient, accepted their input and they are in agreement with the treatment goals.          Marcelino Jin MD

## 2021-10-19 NOTE — PROGRESS NOTES
Anabela Leonard presents today for   Chief Complaint   Patient presents with    Follow-up       Is someone accompanying this pt? no    Is the patient using any DME equipment during OV? no    Depression Screening:  3 most recent PHQ Screens 10/19/2021   Little interest or pleasure in doing things Not at all   Feeling down, depressed, irritable, or hopeless Not at all   Total Score PHQ 2 0       Learning Assessment:  Learning Assessment 4/2/2015   PRIMARY LEARNER Patient   R Rachell Morejon 76 2 YEARS OF COLLEGE   PRIMARY LANGUAGE ENGLISH   LEARNER PREFERENCE PRIMARY DEMONSTRATION   ANSWERED BY patient   RELATIONSHIP SELF       Abuse Screening:  Abuse Screening Questionnaire 10/19/2021   Do you ever feel afraid of your partner? N   Are you in a relationship with someone who physically or mentally threatens you? N   Is it safe for you to go home? Y       Fall Risk  No flowsheet data found. Health Maintenance reviewed and discussed and ordered per Provider. Health Maintenance Due   Topic Date Due    Hepatitis C Screening  Never done    DTaP/Tdap/Td series (1 - Tdap) Never done    Colorectal Cancer Screening Combo  Never done   . Coordination of Care:  1. Have you been to the ER, urgent care clinic since your last visit? Hospitalized since your last visit? no    2. Have you seen or consulted any other health care providers outside of the [address] [place] [address] since your last visit? Include any pap smears or colon screening.  no      Last  Checked na  Last UDS Checked na  Last Pain contract signed: corey Purposeful rounding completed:    Side rails up x 1:  YES   Bed low and wheels and locked: YES   Call bell in reach: YES   Comfort addressed: YES     Toileting needs addressed: YES   Plan of care reviewed/updated with patient and or family members: YES   IV site assessed: YES  Pain assessed and addressed: Daquan Oseguera

## 2021-10-19 NOTE — PROGRESS NOTES
..  [address] - Outpatient Nutrition Services  [address] [facility], [place] [address]   Nutrition Assessment  Medical Nutrition Therapy   Outpatient Initial Evaluation         Patient Name: Ronnie Brody : 1975   Treatment Diagnosis: Pre-Diabetes, Hyperlipidemia   Referral Source: Jamar Cohen [facility] Care [facility]): 10/19/2021     Gender: female Age: 40 y.o. Ht: 69 in Wt: 316  lb  kg   BMI: 46.16 BMR   Male  BMR Female 2124     Past Medical History: Morbid obesity, migraines, TIA, former smoker     Pertinent Medications:     Propanolol, ASA    Biochemical Data:   Lab Results   Component Value Date/Time    Hemoglobin A1c 5.7 (H) 2021 08:52 AM     Lab Results   Component Value Date/Time    Sodium 140 2021 08:52 AM    Potassium 4.6 2021 08:52 AM    Chloride 103 2021 08:52 AM    CO2 25 2021 08:52 AM    Anion gap 12.0 2021 08:52 AM    Glucose 87 2021 08:52 AM    BUN 12 2021 08:52 AM    Creatinine 1.2 2021 08:52 AM    BUN/Creatinine ratio 9 (L) 2020 09:35 AM    GFR est AA >60 2020 09:35 AM    GFR est non-AA 50 (L) 2020 09:35 AM    Calcium 9.2 2021 08:52 AM    Bilirubin, total 0.4 2021 08:52 AM    Alk. phosphatase 138 (H) 2021 08:52 AM    Protein, total 6.7 2021 08:52 AM    Albumin 4.0 2021 08:52 AM    Globulin 2.7 2021 08:52 AM    A-G Ratio 1.5 2021 08:52 AM    ALT (SGPT) 12 2021 08:52 AM    AST (SGOT) 14 2021 08:52 AM     Lab Results   Component Value Date/Time    Cholesterol, total 185 2021 08:52 AM    HDL Cholesterol 36 (L) 2021 08:52 AM    LDL, calculated 122 (H) 2021 08:52 AM    VLDL, calculated 27 2021 08:52 AM    Triglyceride 135 2021 08:52 AM    CHOL/HDL Ratio 3.6 2019 11:30 AM     Lab Results   Component Value Date/Time    ALT (SGPT) 12 2021 08:52 AM    AST (SGOT) 14 2021 08:52 AM    Alk.  phosphatase 138 (H) 08/26/2021 08:52 AM    Bilirubin, total 0.4 08/26/2021 08:52 AM     Lab Results   Component Value Date/Time    Creatinine 1.2 08/26/2021 08:52 AM     Lab Results   Component Value Date/Time    BUN 12 08/26/2021 08:52 AM          Assessment:   The patient is here for weight loss counseling. She said she was not told that she has pre-diabetes but was told instead that she is \"borderline. \" She says she has a strong family hx for type 2 DM. The patient states, \"I eat healthy and I still gain weight. \" She said she has gained approximately 60# during the pandemic by ordering out and not exercising. \"I don't eat fast food. \"  The patient says she has a rowing machine and she does that 3-4 nights per week for 45 min to 1 hour. Food & Nutrition: Breakfast (10-11): oatmeal or [place] yogurt with cranberries and granola  2 cups of coffee with cream and sugar, sometimes Diet cranberry juice  Lunch (1-2): Tropical Smoothie wrap and smoothie OR which which sandwich OR brings a sandwich from home (honey whole wheat bread, lettuce tomato, nicholas and ham or salami or homemade chicken or tuna salad)  Dinner (before 6): Baked chicken or grilled chicken, brown rice and vegetables  Beverages: \"lots of water\", sometimes she uses honey in her hot tea, coffee with cream and sugar, no alcohol    Problem areas: no portion control of carbs (not measuring), ordering out for lunch on most days, does not have awareness of all of the carbs, not enough exercise and not being consistent         Estimate Needs   Calories: 1600  Protein: 100 Carbs: 180 Fat: 53   Kcal/day  g/day  g/day  g/day        percent: 25  45  30               Nutrition Diagnosis Obesity related to excessive energy intake as evidenced by BMI of 46.16    Altered nutrition related lab values related to pre-diabetes as evidenced by A1C = 5.7%         Nutrition Intervention &  Education: Educated patient on pre- diabetes, weight loss diet with plate method guidelines. Encouraged the patient to eat at least 3 times per day, spacing meals no more than 4-5 hours apart. Discussed that 1/2 the plate should include non-starchy vegetables, 1/4 plate should be lean protein, and 1/4 of plate should be carbohydrate. Provided the patient with list of macro-nutrient sources (CHO, pro, and fat) and appropriate amounts of CHO to be consumed at each meal and snack. Encouraged the patient to try using measuring cups to familiarize with appropriate serving sizes. Suggested the patient include protein source with all meals and snacks. Include more non-starchy vegetables and only 2 fruit servings per day (eat a variety). Don't drink calories: avoid fruit juice, regular sodas, sweet tea, Gatorade. Eliminate/decrease fast food consumption. We discussed the importance of timing of meals. Discussed importance of 150 minutes per week physical activity.      Handouts Provided: [x]  Carbohydrates  [x]  Protein  [x]  Non-starchy Vegetables  []  Food Label  [x]  Meal and Snack Ideas  []  Food Journals [x]  Diabetes  [x]  Cholesterol  []  Sodium  [x]  Gen Nutr Guidelines  []  SBGM Guidelines  [x]  Others: My healthy Plate, Sugar Shockers   Information Reviewed with: Patient    Readiness to Change Stage: []  Pre-contemplative    []  Contemplative  [x]  Preparation               []  Action                  []  Maintenance   Potential Barriers to Learning: []  Decline in memory    []  Language barrier   []  Other:  []  Emotional                  []  Limited mobility  []  Lack of motivation     [] Vision, hearing or cognitive impairment   Expected Compliance: Fair to good- she says she is willing to make a few changes     Nutritional Goal - To promote lifestyle changes to result in:    [x]  Weight loss  [x]  Improved diabetic control  []  Decreased cholesterol levels  []  Decreased blood pressure  []  Weight maintenance []  Preventing any interactions associated with food allergies  []  Adequate weight gain toward goal weight  []  Other:        Patient Goals:   1. Eat 3 balanced meals per day. Portion control your carbs. Load plate with non-starchy vegetables and include lean protein  2. No sugary drinks. Do not add sugar and honey to coffee and tea. Consume 64 oz water daily  3. Bring your lunch more often (don't eat out daily)  4. Be consistent with exercise. Try walking after meals. Goal is 30 minutes of physical activity, 5 days per week. Paris Garcia RD, [facility]  Follow-up: Patient did not make FU appointment today. She has RD contact info for questions and concerns and she may make an  appointment if she needs more assistance.  Time: 1:30 PM

## 2022-01-13 DIAGNOSIS — G43.819 OTHER MIGRAINE WITHOUT STATUS MIGRAINOSUS, INTRACTABLE: ICD-10-CM

## 2022-01-14 RX ORDER — PROPRANOLOL HYDROCHLORIDE 60 MG/1
CAPSULE, EXTENDED RELEASE ORAL
Qty: 90 CAPSULE | Refills: 0 | Status: SHIPPED | OUTPATIENT
Start: 2022-01-14 | End: 2022-04-11

## 2022-01-28 DIAGNOSIS — G43.819 OTHER MIGRAINE WITHOUT STATUS MIGRAINOSUS, INTRACTABLE: Primary | ICD-10-CM

## 2022-01-28 DIAGNOSIS — G43.819 OTHER MIGRAINE WITHOUT STATUS MIGRAINOSUS, INTRACTABLE: ICD-10-CM

## 2022-01-28 RX ORDER — BUTALBITAL, ACETAMINOPHEN AND CAFFEINE 50; 325; 40 MG/1; MG/1; MG/1
1 TABLET ORAL
Qty: 30 TABLET | Refills: 0 | Status: SHIPPED | OUTPATIENT
Start: 2022-01-28 | End: 2022-01-28 | Stop reason: SDUPTHER

## 2022-01-28 RX ORDER — BUTALBITAL, ACETAMINOPHEN AND CAFFEINE 50; 325; 40 MG/1; MG/1; MG/1
1 TABLET ORAL
Qty: 30 TABLET | Refills: 1 | Status: SHIPPED | OUTPATIENT
Start: 2022-01-28

## 2022-01-28 NOTE — TELEPHONE ENCOUNTER
Patient requested to have prescription sent to the pharmacy. Requested Prescriptions     Pending Prescriptions Disp Refills    butalbital-acetaminophen-caffeine (FIORICET, ESGIC) -40 mg per tablet 30 Tablet 0     Sig: Take 1 Tablet by mouth every six (6) hours as needed for Headache.

## 2022-03-20 PROBLEM — G45.9 TIA (TRANSIENT ISCHEMIC ATTACK): Status: ACTIVE | Noted: 2019-01-17

## 2022-03-20 PROBLEM — G43.909 MIGRAINE: Status: ACTIVE | Noted: 2019-01-17

## 2022-04-09 DIAGNOSIS — G43.819 OTHER MIGRAINE WITHOUT STATUS MIGRAINOSUS, INTRACTABLE: ICD-10-CM

## 2022-04-11 RX ORDER — PROPRANOLOL HYDROCHLORIDE 60 MG/1
CAPSULE, EXTENDED RELEASE ORAL
Qty: 90 CAPSULE | Refills: 0 | Status: SHIPPED | OUTPATIENT
Start: 2022-04-11 | End: 2022-08-08

## 2022-06-29 ENCOUNTER — HOSPITAL ENCOUNTER (OUTPATIENT)
Dept: WOMENS IMAGING | Age: 47
Discharge: HOME | End: 2022-06-29
Payer: COMMERCIAL

## 2022-06-29 DIAGNOSIS — Z12.31 ENCOUNTER FOR SCREENING MAMMOGRAM FOR BREAST CANCER: ICD-10-CM

## 2022-06-29 PROCEDURE — 77067 SCR MAMMO BI INCL CAD: CPT

## 2022-08-02 DIAGNOSIS — L30.4 INTERTRIGO: Primary | ICD-10-CM

## 2022-08-02 RX ORDER — KETOCONAZOLE 20 MG/G
CREAM TOPICAL DAILY
Qty: 15 G | Refills: 0 | Status: SHIPPED | OUTPATIENT
Start: 2022-08-02

## 2022-08-08 DIAGNOSIS — G43.819 OTHER MIGRAINE WITHOUT STATUS MIGRAINOSUS, INTRACTABLE: ICD-10-CM

## 2022-08-08 RX ORDER — PROPRANOLOL HYDROCHLORIDE 60 MG/1
CAPSULE, EXTENDED RELEASE ORAL
Qty: 90 CAPSULE | Refills: 0 | Status: SHIPPED | OUTPATIENT
Start: 2022-08-08

## 2022-11-06 DIAGNOSIS — G43.819 OTHER MIGRAINE WITHOUT STATUS MIGRAINOSUS, INTRACTABLE: ICD-10-CM

## 2022-11-08 RX ORDER — PROPRANOLOL HYDROCHLORIDE 60 MG/1
CAPSULE, EXTENDED RELEASE ORAL
Qty: 90 CAPSULE | Refills: 0 | Status: SHIPPED | OUTPATIENT
Start: 2022-11-08

## 2022-11-09 ENCOUNTER — PATIENT MESSAGE (OUTPATIENT)
Dept: FAMILY MEDICINE CLINIC | Age: 47
End: 2022-11-09

## 2022-11-30 DIAGNOSIS — R73.03 PREDIABETES: Primary | ICD-10-CM

## 2022-11-30 DIAGNOSIS — E78.2 MIXED HYPERLIPIDEMIA: ICD-10-CM

## 2022-12-20 ENCOUNTER — OFFICE VISIT (OUTPATIENT)
Dept: FAMILY MEDICINE CLINIC | Age: 47
End: 2022-12-20
Payer: COMMERCIAL

## 2022-12-20 ENCOUNTER — HOSPITAL ENCOUNTER (OUTPATIENT)
Dept: LAB | Age: 47
Discharge: HOME | End: 2022-12-20

## 2022-12-20 VITALS
TEMPERATURE: 98.3 F | SYSTOLIC BLOOD PRESSURE: 188 MMHG | WEIGHT: 293 LBS | OXYGEN SATURATION: 99 % | DIASTOLIC BLOOD PRESSURE: 92 MMHG | RESPIRATION RATE: 16 BRPM | HEART RATE: 77 BPM | HEIGHT: 69 IN | BODY MASS INDEX: 43.4 KG/M2

## 2022-12-20 DIAGNOSIS — R73.03 PREDIABETES: ICD-10-CM

## 2022-12-20 DIAGNOSIS — I10 ESSENTIAL HYPERTENSION: Primary | ICD-10-CM

## 2022-12-20 DIAGNOSIS — G43.819 OTHER MIGRAINE WITHOUT STATUS MIGRAINOSUS, INTRACTABLE: ICD-10-CM

## 2022-12-20 DIAGNOSIS — Z00.00 PHYSICAL EXAM: ICD-10-CM

## 2022-12-20 DIAGNOSIS — Z12.11 SCREEN FOR COLON CANCER: ICD-10-CM

## 2022-12-20 LAB — SENTARA SPECIMEN COL,SENBCF: NORMAL

## 2022-12-20 PROCEDURE — 99001 SPECIMEN HANDLING PT-LAB: CPT

## 2022-12-20 RX ORDER — PROPRANOLOL HYDROCHLORIDE 60 MG/1
60 CAPSULE, EXTENDED RELEASE ORAL DAILY
Qty: 90 CAPSULE | Refills: 1 | Status: SHIPPED | OUTPATIENT
Start: 2022-12-20

## 2022-12-20 RX ORDER — AMLODIPINE BESYLATE 5 MG/1
5 TABLET ORAL DAILY
Qty: 30 TABLET | Refills: 3 | Status: SHIPPED | OUTPATIENT
Start: 2022-12-20

## 2022-12-20 NOTE — PROGRESS NOTES
Janine Ochoa presents today for No chief complaint on file. Is someone accompanying this pt? no    Is the patient using any DME equipment during [address]? no    Depression Screening:  3 most recent PHQ Screens 12/20/2022   Little interest or pleasure in doing things Not at all   Feeling down, depressed, irritable, or hopeless Not at all   Total Score PHQ 2 0       Learning Assessment:  Learning Assessment 4/2/2015   PRIMARY LEARNER Patient   R Rachell Morejon 76 2 YEARS OF COLLEGE   PRIMARY LANGUAGE ENGLISH   LEARNER PREFERENCE PRIMARY DEMONSTRATION   ANSWERED BY patient   RELATIONSHIP SELF       Abuse Screening:  Abuse Screening Questionnaire 10/19/2021   Do you ever feel afraid of your partner? N   Are you in a relationship with someone who physically or mentally threatens you? N   Is it safe for you to go home? Y       Fall Risk  No flowsheet data found. Health Maintenance reviewed and discussed and ordered per Provider. Health Maintenance Due   Topic Date Due    Hepatitis C Screening  Never done    DTaP/Tdap/Td series (1 - Tdap) Never done    COVID-19 Vaccine (3 - Booster for Moderna series) 04/17/2021    Colorectal Cancer Screening Combo  Never done    Flu Vaccine (1) 08/01/2022    Depression Screen  10/19/2022   .        1. \"Have you been to the ER, urgent care clinic since your last visit? Hospitalized since your last visit? \" No    2. \"Have you seen or consulted any other health care providers outside of the [address] [place] [address] since your last visit? \" No     3. For patients over 45: Has the patient had a colonoscopy? No     If the patient is female:    4. For patients over 40: Has the patient had a mammogram? Yes - no Care Gap present    5. For patients over 21: Has the patient had a pap smear?  No

## 2022-12-20 NOTE — PROGRESS NOTES
Shanon Alva is a 56 y.o.  female and presents with    Chief Complaint  Patient presents with    Physical          Subjective:    Pt is here for her physical. States she is going on through a lot. She states her father is having issues w/ her heart d/t defibrillator. Also she has been having a lot of work. She states she does not feel stressed. Regarding her migraines. She feels this year she has only had 1 migraine. She has noted that she cannot miss the propanolol. Feels the propanolol seems to be definitely helping her out. Patient Active Problem List  Diagnosis  Code    TIA (transient ischemic attack)  G45.9    Migraine  G43.909     Past Medical History:  Diagnosis  Date    Migraines    TIA (transient ischemic attack)     Past Surgical History:  Procedure  Laterality  Date    HX [address]    two times    HX HYSTERECTOMY     Family History  Problem  Relation  Age of Onset    Cancer  Other        mother side of family    Diabetes  Other        mother side of family    Hypertension  Other        mother side of family    Breast Cancer  Paternal Grandmother            Breast Cancer  Maternal Aunt            Social History    Socioeconomic History    Marital status:  LIFE PARTNER    Spouse name:  Not on file    Number of children:  Not on file    Years of education:  Not on file    Highest education level:  Not on file  Occupational History    Occupation:   for home health care  Tobacco Use    Smoking status:  Former    Packs/day:  0.50    Types:  Cigarettes    Quit date:  2020    Years since quittin.0    Smokeless tobacco:  Never  Vaping Use    Vaping Use:  Some days    Substances:  Flavoring, Enjoy  Substance and Sexual Activity    Alcohol use:   Yes    Alcohol/week:  0.8 standard drinks    Types:  1 drink(s) per week    Comment: wine occasionally    Drug use:  No    Sexual activity:  Not on file  Other Topics  Concern    Not on file  Social History Narrative    Not on file    Social Determinants of Health    Financial Resource Strain: Not on file  Food Insecurity: Not on file  Transportation Needs: Not on file  Physical Activity: Not on file  Stress: Not on file  Social Connections: Not on file  Intimate Partner Violence: Not on file  Housing Stability: Not on file       Current Outpatient Medications  Medication  Sig  Dispense  Refill    propranolol LA (INDERAL LA) 60 mg SR capsule  TAKE 1 CAPSULE BY MOUTH EVERY DAY  90 Capsule  0    ketoconazole (NIZORAL) 2 % topical cream  Apply  to affected area daily. 15 g  0    butalbital-acetaminophen-caffeine (FIORICET, ESGIC) -40 mg per tablet  Take 1 Tablet by mouth every six (6) hours as needed for Headache. 30 Tablet  1       ROS   Review of Systems   Constitutional:  Negative for chills, fever and malaise/fatigue. HENT:  Negative for congestion, ear discharge and ear pain. Eyes:  Negative for blurred vision, pain and discharge. Respiratory:  Negative for cough and shortness of breath. Cardiovascular:  Negative for chest pain and palpitations. Gastrointestinal:  Negative for abdominal pain, nausea and vomiting. Genitourinary:  Negative for dysuria, frequency and urgency. Skin:  Negative for itching and rash. Neurological:  Negative for dizziness, seizures, loss of consciousness and headaches. Psychiatric/Behavioral:  Negative for substance abuse. Objective:  Vitals:    12/20/22 1355  12/20/22 1358  BP:  (!) 184/112  (!) 188/92  Pulse:  77  Resp:  16  Temp:  98.3 °F (36.8 °C)  TempSrc:  Temporal  SpO2:  99%  Weight:  297 lb (134.7 kg)  Height:  5' 9\" (1.753 m)      Physical Exam  Vitals reviewed. Constitutional:       Appearance: Normal appearance. HENT:      Right Ear: Tympanic membrane, ear canal and external ear normal. There is no impacted cerumen.       Left Ear: Tympanic membrane, ear canal and external ear normal. There is no impacted cerumen. Nose: Nose normal. No congestion or rhinorrhea. Mouth/Throat:      Mouth: Mucous membranes are dry. Pharynx: Oropharynx is clear. No oropharyngeal exudate or posterior oropharyngeal erythema. Eyes:      General: No scleral icterus. Right eye: No discharge. Left eye: No discharge. Extraocular Movements: Extraocular movements intact. Cardiovascular:      Rate and Rhythm: Normal rate and regular rhythm. Heart sounds: No murmur heard. Pulmonary:      Effort: Pulmonary effort is normal. No respiratory distress. Breath sounds: Normal breath sounds. No stridor. No wheezing, rhonchi or rales. Chest:      Chest wall: No tenderness. Abdominal:      General: Abdomen is flat. Bowel sounds are normal.      Palpations: Abdomen is soft. Tenderness: There is no abdominal tenderness. Musculoskeletal:         General: No swelling, tenderness, deformity or signs of injury. Normal range of motion. Cervical back: Normal range of motion and neck supple. Right lower leg: No edema. Left lower leg: No edema. Lymphadenopathy:      Cervical: No cervical adenopathy. Skin:     General: Skin is warm and dry. Neurological:      Mental Status: She is alert and oriented to person, place, and time. Cranial Nerves: No cranial nerve deficit. Deep Tendon Reflexes: Reflexes normal.   Psychiatric:         Mood and Affect: Mood normal.         Behavior: Behavior normal.         Thought Content: Thought content normal.         Judgment: Judgment normal.         LABS     TESTS      Assessment/Plan:    1. Essential hypertension  New dx  - amLODIPine (NORVASC) 5 mg tablet; Take 1 Tablet by mouth daily. Dispense: 30 Tablet; Refill: 3  - LIPID PANEL  - METABOLIC PANEL, COMPREHENSIVE  - CBC WITH AUTOMATED DIFF    2. Other migraine without status migrainosus, intractable  - propranolol LA (INDERAL LA) 60 mg SR capsule; Take 1 Capsule by mouth daily. Dispense: 90 Capsule; Refill: 1    3. Screen for colon cancer  - REFERRAL FOR COLONOSCOPY    4. Physical exam  WNL    5. Prediabetes  - HEMOGLOBIN A1C W/O EAG        Lab review: orders written for new lab studies as appropriate; see orders      I have discussed the diagnosis with the patient and the intended plan as seen in the above orders. The patient has received an after-visit summary and questions were answered concerning future plans. I have discussed medication side effects and warnings with the patient as well. I have reviewed the plan of care with the patient, accepted their input and they are in agreement with the treatment goals.          Marleny Clayton MD

## 2022-12-21 LAB
A-G RATIO,AGRAT: 1.4 RATIO (ref 1.1–2.6)
ABSOLUTE LYMPHOCYTE COUNT, 10803: 2.3 K/UL (ref 1–4.8)
ALBUMIN SERPL-MCNC: 3.7 G/DL (ref 3.5–5)
ALP SERPL-CCNC: 120 U/L (ref 25–115)
ALT SERPL-CCNC: 6 U/L (ref 5–40)
ANION GAP SERPL CALC-SCNC: 11 MMOL/L (ref 3–15)
AST SERPL W P-5'-P-CCNC: 16 U/L (ref 10–37)
AVG GLU, 10930: 113 MG/DL (ref 91–123)
BASOPHILS # BLD: 0.1 K/UL (ref 0–0.2)
BASOPHILS NFR BLD: 1 % (ref 0–2)
BILIRUB SERPL-MCNC: 0.3 MG/DL (ref 0.2–1.2)
BUN SERPL-MCNC: 11 MG/DL (ref 6–22)
CALCIUM SERPL-MCNC: 9 MG/DL (ref 8.4–10.5)
CHLORIDE SERPL-SCNC: 105 MMOL/L (ref 98–110)
CHOLEST SERPL-MCNC: 179 MG/DL (ref 110–200)
CO2 SERPL-SCNC: 27 MMOL/L (ref 20–32)
CREAT SERPL-MCNC: 1.2 MG/DL (ref 0.5–1.2)
EOSINOPHIL # BLD: 0.2 K/UL (ref 0–0.5)
EOSINOPHIL NFR BLD: 2 % (ref 0–6)
ERYTHROCYTE [DISTWIDTH] IN BLOOD BY AUTOMATED COUNT: 13.8 % (ref 10–15.5)
GLOBULIN,GLOB: 2.6 G/DL (ref 2–4)
GLOMERULAR FILTRATION RATE: 54.4 ML/MIN/1.73 SQ.M.
GLUCOSE SERPL-MCNC: 97 MG/DL (ref 70–99)
GRANULOCYTES,GRANS: 68 % (ref 40–75)
HBA1C MFR BLD HPLC: 5.6 % (ref 4.8–5.6)
HCT VFR BLD AUTO: 48 % (ref 35.1–48)
HDLC SERPL-MCNC: 4.2 MG/DL (ref 0–5)
HDLC SERPL-MCNC: 43 MG/DL
HGB BLD-MCNC: 14.8 G/DL (ref 11.7–16)
LDL/HDL RATIO,LDHD: 2.7
LDLC SERPL CALC-MCNC: 114 MG/DL (ref 50–99)
LYMPHOCYTES, LYMLT: 22 % (ref 20–45)
MCH RBC QN AUTO: 28 PG (ref 26–34)
MCHC RBC AUTO-ENTMCNC: 31 G/DL (ref 31–36)
MCV RBC AUTO: 90 FL (ref 80–99)
MONOCYTES # BLD: 0.8 K/UL (ref 0.1–1)
MONOCYTES NFR BLD: 8 % (ref 3–12)
NEUTROPHILS # BLD AUTO: 7 K/UL (ref 1.8–7.7)
NON-HDL CHOLESTEROL, 011976: 136 MG/DL
PLATELET # BLD AUTO: 214 K/UL (ref 140–440)
PMV BLD AUTO: 10.9 FL (ref 9–13)
POTASSIUM SERPL-SCNC: 4.4 MMOL/L (ref 3.5–5.5)
PROT SERPL-MCNC: 6.3 G/DL (ref 6.4–8.3)
RBC # BLD AUTO: 5.35 M/UL (ref 3.8–5.2)
SODIUM SERPL-SCNC: 143 MMOL/L (ref 133–145)
TRIGL SERPL-MCNC: 112 MG/DL (ref 40–149)
VLDLC SERPL CALC-MCNC: 22 MG/DL (ref 8–30)
WBC # BLD AUTO: 10.4 K/UL (ref 4–11)

## 2023-01-03 ENCOUNTER — CLINICAL SUPPORT (OUTPATIENT)
Dept: FAMILY MEDICINE CLINIC | Age: 48
End: 2023-01-03

## 2023-01-03 VITALS
BODY MASS INDEX: 42.83 KG/M2 | HEART RATE: 80 BPM | RESPIRATION RATE: 17 BRPM | TEMPERATURE: 98.1 F | SYSTOLIC BLOOD PRESSURE: 139 MMHG | WEIGHT: 290 LBS | DIASTOLIC BLOOD PRESSURE: 92 MMHG | OXYGEN SATURATION: 98 %

## 2023-01-03 DIAGNOSIS — I10 ESSENTIAL HYPERTENSION: ICD-10-CM

## 2023-01-03 DIAGNOSIS — N18.31 STAGE 3A CHRONIC KIDNEY DISEASE (CKD) (HCC): Primary | ICD-10-CM

## 2023-01-03 RX ORDER — LOSARTAN POTASSIUM 25 MG/1
25 TABLET ORAL DAILY
Qty: 30 TABLET | Refills: 1 | Status: SHIPPED | OUTPATIENT
Start: 2023-01-03

## 2023-01-03 NOTE — PROGRESS NOTES
Per Dr. Brennan Rivera instructions patient presents today for a blood pressure check. Patient seated and resting for 15 minutes with both feet flat on the floor. Blood pressure taken and documented. Reported blood pressure to Dr. Brennan Rivera.

## 2023-01-03 NOTE — PROGRESS NOTES
Discussed w/ pt about BP. Amlodipine causing side effects. Will place pt on Losartan since CKD stage 3 also noticed on labs. She verbalized understanding. RTC in 2 weeks for nurse visit.

## 2023-01-18 ENCOUNTER — CLINICAL SUPPORT (OUTPATIENT)
Dept: FAMILY MEDICINE CLINIC | Age: 48
End: 2023-01-18

## 2023-01-18 VITALS
DIASTOLIC BLOOD PRESSURE: 86 MMHG | OXYGEN SATURATION: 98 % | SYSTOLIC BLOOD PRESSURE: 127 MMHG | HEART RATE: 77 BPM | TEMPERATURE: 98.4 F

## 2023-01-18 DIAGNOSIS — I10 ESSENTIAL HYPERTENSION: Primary | ICD-10-CM

## 2023-01-18 NOTE — PROGRESS NOTES
Per DR. Aguiar instructions patient presents today for a blood pressure check. Patient seated and resting for 15 minutes with both feet flat on the floor. Blood pressure taken and documented. Reported blood pressure to Dr. Nirmal Grubbs.

## 2023-01-28 DIAGNOSIS — L30.4 INTERTRIGO: ICD-10-CM

## 2023-01-30 NOTE — TELEPHONE ENCOUNTER
This patient contacted the office for the following prescriptions to be refilled:    Medication requested :   Requested Prescriptions     Pending Prescriptions Disp Refills    ketoconazole (NIZORAL) 2 % topical cream 15 g 0     Sig: Apply  to affected area daily. PCP: Alfred Kyle MD  LOV: 1/18/2023 NOV DMA: Visit date not found  FUTURE APPT: No future appointments. Thank you.

## 2023-01-31 RX ORDER — KETOCONAZOLE 20 MG/G
CREAM TOPICAL DAILY
Qty: 15 G | Refills: 0 | Status: SHIPPED | OUTPATIENT
Start: 2023-01-31

## 2023-02-03 DIAGNOSIS — G43.819 OTHER MIGRAINE WITHOUT STATUS MIGRAINOSUS, INTRACTABLE: ICD-10-CM

## 2023-02-03 NOTE — TELEPHONE ENCOUNTER
This patient contacted the office for the following prescriptions to be refilled:    Medication requested :   Requested Prescriptions     Pending Prescriptions Disp Refills    propranolol LA (INDERAL LA) 60 mg SR capsule 90 Capsule 1     Sig: Take 1 Capsule by mouth daily. PCP: Dharmesh Saavedra MD  LOV: 1/18/2023 NOV DMA: Visit date not found  FUTURE APPT: No future appointments. Thank you.

## 2023-02-06 RX ORDER — PROPRANOLOL HYDROCHLORIDE 60 MG/1
60 CAPSULE, EXTENDED RELEASE ORAL DAILY
Qty: 90 CAPSULE | Refills: 1 | Status: SHIPPED | OUTPATIENT
Start: 2023-02-06

## 2023-02-21 ENCOUNTER — TELEPHONE (OUTPATIENT)
Age: 48
End: 2023-02-21

## 2023-02-27 ENCOUNTER — PATIENT MESSAGE (OUTPATIENT)
Facility: CLINIC | Age: 48
End: 2023-02-27

## 2023-02-27 RX ORDER — AMLODIPINE BESYLATE 5 MG/1
5 TABLET ORAL DAILY
Qty: 90 TABLET | Refills: 1 | Status: SHIPPED | OUTPATIENT
Start: 2023-02-27

## 2023-02-27 NOTE — TELEPHONE ENCOUNTER
From: Edinson Carrington  To: Dr. Gordo Madrid: 2/27/2023 1:58 PM EST  Subject: Refill needed    Hel, the new site will not allow me to request a refill on BP Medication   amLODIPine 5 MG tablet  Commonly known as: NORVASC  Send to [facility] please in [place].      Thank you

## 2023-03-17 DIAGNOSIS — I10 ESSENTIAL (PRIMARY) HYPERTENSION: ICD-10-CM

## 2023-03-17 NOTE — TELEPHONE ENCOUNTER
This patient contacted the office for the following prescriptions to be refilled:    Medication requested :   Requested Prescriptions     Pending Prescriptions Disp Refills    amLODIPine (NORVASC) 5 MG tablet [Pharmacy Med Name: AMLODIPINE BESYLATE 5 MG TAB] 90 tablet 1     Sig: TAKE 1 TABLET BY Tonya Smith      PCP: Delano Stevens MD  LOV:           12/20/22 an IN OFFICE  NOV DMA: 3/20/2023  FUTURE APPT:   Future Appointments   Date Time Provider Nikia Mccarthy   3/20/2023  1:40 PM MD VICKY Honeycutt   5/31/2023  1:00 PM HR [facility] NURSE [facility] BS SH[place]         Thank you.

## 2023-03-18 RX ORDER — AMLODIPINE BESYLATE 5 MG/1
TABLET ORAL
Qty: 90 TABLET | Refills: 1 | Status: SHIPPED | OUTPATIENT
Start: 2023-03-18

## 2023-03-20 ENCOUNTER — OFFICE VISIT (OUTPATIENT)
Facility: CLINIC | Age: 48
End: 2023-03-20
Payer: COMMERCIAL

## 2023-03-20 VITALS
TEMPERATURE: 98.1 F | OXYGEN SATURATION: 97 % | HEART RATE: 82 BPM | DIASTOLIC BLOOD PRESSURE: 84 MMHG | WEIGHT: 291.8 LBS | RESPIRATION RATE: 16 BRPM | BODY MASS INDEX: 43.22 KG/M2 | SYSTOLIC BLOOD PRESSURE: 121 MMHG | HEIGHT: 69 IN

## 2023-03-20 DIAGNOSIS — Z11.59 NEED FOR HEPATITIS C SCREENING TEST: ICD-10-CM

## 2023-03-20 DIAGNOSIS — Z12.11 COLON CANCER SCREENING: ICD-10-CM

## 2023-03-20 DIAGNOSIS — G43.819 OTHER MIGRAINE, INTRACTABLE, WITHOUT STATUS MIGRAINOSUS: ICD-10-CM

## 2023-03-20 DIAGNOSIS — I10 ESSENTIAL (PRIMARY) HYPERTENSION: Primary | ICD-10-CM

## 2023-03-20 DIAGNOSIS — R73.03 PREDIABETES: ICD-10-CM

## 2023-03-20 PROCEDURE — 3079F DIAST BP 80-89 MM HG: CPT | Performed by: STUDENT IN AN ORGANIZED HEALTH CARE EDUCATION/TRAINING PROGRAM

## 2023-03-20 PROCEDURE — 3074F SYST BP LT 130 MM HG: CPT | Performed by: STUDENT IN AN ORGANIZED HEALTH CARE EDUCATION/TRAINING PROGRAM

## 2023-03-20 PROCEDURE — 99214 OFFICE O/P EST MOD 30 MIN: CPT | Performed by: STUDENT IN AN ORGANIZED HEALTH CARE EDUCATION/TRAINING PROGRAM

## 2023-03-20 ASSESSMENT — PATIENT HEALTH QUESTIONNAIRE - PHQ9
1. LITTLE INTEREST OR PLEASURE IN DOING THINGS: 0
2. FEELING DOWN, DEPRESSED OR HOPELESS: 1
SUM OF ALL RESPONSES TO PHQ QUESTIONS 1-9: 1
SUM OF ALL RESPONSES TO PHQ9 QUESTIONS 1 & 2: 1
SUM OF ALL RESPONSES TO PHQ QUESTIONS 1-9: 1

## 2023-03-20 ASSESSMENT — ENCOUNTER SYMPTOMS
EYE DISCHARGE: 0
DIARRHEA: 0
EYE PAIN: 0
CONSTIPATION: 0
VOMITING: 0
EYE ITCHING: 0
FACIAL SWELLING: 0
ABDOMINAL PAIN: 0
EYE REDNESS: 0
BACK PAIN: 0
COLOR CHANGE: 0
SHORTNESS OF BREATH: 0

## 2023-03-20 NOTE — PROGRESS NOTES
Cb Koch is a 56 y.o.  female and presents with    Chief Complaint   Patient presents with    Hypertension           Subjective:    Hypertension follow up:  Taking medications as prescribed: Yes  Checking BP at home: No  Symptoms: none  Low sodium diet: Yes  Exercise: No     Amlodipine has made her feel tired. She is taking it in the day. She has not been having migraines.        Patient Active Problem List   Diagnosis    TIA (transient ischemic attack)    Migraine      Past Medical History:   Diagnosis Date    Migraines     TIA (transient ischemic attack)       Past Surgical History:   Procedure Laterality Date    [address]    two times    HYSTERECTOMY (CERVIX STATUS UNKNOWN)        Family History   Problem Relation Age of Onset    Breast Cancer Maternal Aunt             Hypertension Other         mother side of family    Diabetes Other         mother side of family    Cancer Other         mother side of family    Breast Cancer Paternal Grandmother             Diabetes Mother     Gout Mother     Heart Attack Mother     High Blood Pressure Mother     High Cholesterol Mother     Kidney Disease Mother     Heart Attack Father     High Blood Pressure Father     Vision Loss Maternal Grandmother      Social History     Socioeconomic History    Marital status: Life Partner     Spouse name: Not on file    Number of children: Not on file    Years of education: Not on file    Highest education level: Not on file   Occupational History    Not on file   Tobacco Use    Smoking status: Every Day     Packs/day: 0.50     Types: Cigarettes     Last attempt to quit: 2020     Years since quittin.2    Smokeless tobacco: Never   Substance and Sexual Activity    Alcohol use: Yes    Drug use: No    Sexual activity: Not Currently     Partners: Female   Other Topics Concern    Not on file   Social History Narrative    Not on file     Social Determinants of Health

## 2023-03-20 NOTE — PROGRESS NOTES
Katherine Doshi is a 56 y.o. presents today for   Chief Complaint   Patient presents with    Hypertension     Is someone accompanying this pt? No     Is the patient using any DME equipment during OV? No     Health Maintenance Due   Topic Date Due    Hepatitis C screen  Never done    DTaP/Tdap/Td vaccine (1 - Tdap) Never done    COVID-19 Vaccine (3 - Booster for Moderna series) 04/17/2021    Colorectal Cancer Screen  Never done    Flu vaccine (1) 08/01/2022    GFR test (Diabetes, CKD 3-4, OR last GFR 15-59)  08/26/2022         Coordination of Care:   1. \"Have you been to the ER, urgent care clinic since your last visit? Hospitalized since your last visit? \" No    2. \"Have you seen or consulted any other health care providers outside of the [address] [place] [address] since your last visit? \" No     3. For patients aged 39-70: Has the patient had a colonoscopy / FIT/ Cologuard? No      If the patient is female:    4. For patients aged 41-77: Has the patient had a mammogram within the past 2 years? Yes - no Care Gap present      5. For patients aged 21-65: Has the patient had a pap smear?  Yes - no Care Gap present    [address] [place] [address]

## 2023-05-31 ENCOUNTER — NURSE ONLY (OUTPATIENT)
Age: 48
End: 2023-05-31

## 2023-05-31 VITALS
HEIGHT: 69 IN | BODY MASS INDEX: 43.4 KG/M2 | RESPIRATION RATE: 18 BRPM | OXYGEN SATURATION: 98 % | WEIGHT: 293 LBS | HEART RATE: 78 BPM | TEMPERATURE: 97.8 F

## 2023-05-31 DIAGNOSIS — I10 ESSENTIAL HYPERTENSION: Primary | ICD-10-CM

## 2023-05-31 DIAGNOSIS — Z12.11 COLON CANCER SCREENING: ICD-10-CM

## 2023-05-31 DIAGNOSIS — Z80.0 FAMILY HISTORY OF COLON CANCER IN FATHER: ICD-10-CM

## 2023-05-31 NOTE — PROGRESS NOTES
Colon Screen    Patient: Armida Wan MRN: [id number]  SSN: [id number]    YOB: 1975  Age: 53 y.o. Sex: female        Subjective:   Armida Wan was referred by her PCP, Ethel Leger MD.  Patient referred for colonoscopy for   Screening colonoscopy. Patient denies rectal pain or bleeding. Abdominal surgeries as described below, specifically  section x 2 and hysterectomy. Family history as described below, specifically father with colon cancer. Patient has never had a colonoscopy.     Allergies   Allergen Reactions    Banana Nausea Only    Bupropion Other (See Comments)     Amnesia type symptoms    Levonorgestrel-Ethinyl Estrad      Other reaction(s): Runny Nose    Shellfish Allergy Nausea Only    Zolmitriptan Hives     Other reaction(s): rash/itching       Past Medical History:   Diagnosis Date    Hypertension     Migraines     TIA (transient ischemic attack)      Past Surgical History:   Procedure Laterality Date    [address]    two times    HYSTERECTOMY (CERVIX STATUS UNKNOWN)        Family History   Problem Relation Age of Onset    Diabetes Mother     Gout Mother     Heart Attack Mother     High Blood Pressure Mother     High Cholesterol Mother     Kidney Disease Mother     Heart Attack Father     High Blood Pressure Father     Colon Cancer Father     Breast Cancer Maternal Aunt             Vision Loss Maternal Grandmother     Breast Cancer Paternal Grandmother             Hypertension Other         mother side of family    Diabetes Other         mother side of family    Cancer Other         mother side of family     Social History     Tobacco Use    Smoking status: Every Day     Packs/day: 0.50     Types: Cigarettes     Last attempt to quit: 2020     Years since quittin.4    Smokeless tobacco: Never   Substance Use Topics    Alcohol use: Not Currently      Prior to Admission medications    Medication Sig Start Date End Date

## 2023-08-07 RX ORDER — PROPRANOLOL HCL 60 MG
CAPSULE, EXTENDED RELEASE 24HR ORAL
Qty: 90 CAPSULE | Refills: 1 | Status: SHIPPED | OUTPATIENT
Start: 2023-08-07 | End: 2023-08-09 | Stop reason: SDUPTHER

## 2023-08-07 NOTE — TELEPHONE ENCOUNTER
This patient contacted the office for the following prescriptions to be refilled:    Medication requested :   Requested Prescriptions     Pending Prescriptions Disp Refills    propranolol (INDERAL LA) 60 MG extended release capsule [Pharmacy Med Name: PROPRANOLOL ER 60 MG CAPSULE] 90 capsule 1     Sig: TAKE 1 CAPSULE BY MOUTH EVERY DAY *NEED INS      PCP: Tari Montero MD    NOV DMA: 9/20/2023  FUTURE APPT:   Future Appointments   Date Time Provider [address] [place] [address]   9/20/2023  2:00 PM Shanon Jin MD DMA BS AMB         Thank you.

## 2023-08-09 NOTE — TELEPHONE ENCOUNTER
Pharmacy changed. This patient contacted the office for the following prescriptions to be refilled:    Medication requested :   Requested Prescriptions     Pending Prescriptions Disp Refills    propranolol (INDERAL LA) 60 MG extended release capsule 90 capsule 1      PCP: MD JAREN Steve DMA: 9/20/2023  FUTURE APPT:   Future Appointments   Date Time Provider [address]   9/20/2023  2:00 PM Susana Guy Siemens, MD DMA BS AMB         Thank you.

## 2023-08-10 RX ORDER — PROPRANOLOL HCL 60 MG
60 CAPSULE, EXTENDED RELEASE 24HR ORAL DAILY
Qty: 90 CAPSULE | Refills: 1 | Status: SHIPPED | OUTPATIENT
Start: 2023-08-10

## 2023-08-25 ENCOUNTER — PATIENT MESSAGE (OUTPATIENT)
Facility: CLINIC | Age: 48
End: 2023-08-25

## 2023-08-25 RX ORDER — KETOCONAZOLE 20 MG/G
CREAM TOPICAL DAILY
Qty: 30 G | Refills: 2 | Status: SHIPPED | OUTPATIENT
Start: 2023-08-25

## 2023-08-25 NOTE — TELEPHONE ENCOUNTER
Medication(s) requesting:   Requested Prescriptions     Pending Prescriptions Disp Refills    ketoconazole (NIZORAL) 2 % cream 30 g      Sig: Apply topically daily       Last office visit:  3/20/23  Next office visit DMA: 9/20/2023  FUTURE APPT:   Future Appointments   Date Time Provider [address]   9/20/2023  2:00 PM Shanon Gil MD DMA BS AMB

## 2023-08-25 NOTE — TELEPHONE ENCOUNTER
From: Nura Hewitt  To: Dr. Vargas Jurist: 8/25/2023 7:35 AM EDT  Subject: Refill    Hello,   I am unable to request a refill on mychart for ketoconazole 2 % cream.   Please send to [facility].    Thank you

## 2023-09-10 DIAGNOSIS — I10 ESSENTIAL (PRIMARY) HYPERTENSION: ICD-10-CM

## 2023-09-11 RX ORDER — AMLODIPINE BESYLATE 5 MG/1
5 TABLET ORAL DAILY
Qty: 90 TABLET | Refills: 1 | Status: SHIPPED | OUTPATIENT
Start: 2023-09-11

## 2023-09-11 RX ORDER — KETOCONAZOLE 20 MG/G
CREAM TOPICAL DAILY
Qty: 30 G | Refills: 2 | Status: SHIPPED | OUTPATIENT
Start: 2023-09-11

## 2023-09-11 NOTE — TELEPHONE ENCOUNTER
This patient contacted the office for the following prescriptions to be refilled:    Medication requested :   Requested Prescriptions     Pending Prescriptions Disp Refills    amLODIPine (NORVASC) 5 MG tablet 90 tablet 1     Sig: Take 1 tablet by mouth daily    ketoconazole (NIZORAL) 2 % cream 30 g 2     Sig: Apply topically daily      PCP: MD JAREN Resendiz DMA: 9/20/2023  FUTURE APPT:   Future Appointments   Date Time Provider [address] [place] [address]   9/20/2023  2:00 PM Shanon Rivera MD DMA BS AMB         Thank you.

## 2023-09-17 SDOH — ECONOMIC STABILITY: FOOD INSECURITY: WITHIN THE PAST 12 MONTHS, THE FOOD YOU BOUGHT JUST DIDN'T LAST AND YOU DIDN'T HAVE MONEY TO GET MORE.: NEVER TRUE

## 2023-09-17 SDOH — ECONOMIC STABILITY: TRANSPORTATION INSECURITY
IN THE PAST 12 MONTHS, HAS LACK OF TRANSPORTATION KEPT YOU FROM MEETINGS, WORK, OR FROM GETTING THINGS NEEDED FOR DAILY LIVING?: NO

## 2023-09-17 SDOH — ECONOMIC STABILITY: INCOME INSECURITY: HOW HARD IS IT FOR YOU TO PAY FOR THE VERY BASICS LIKE FOOD, HOUSING, MEDICAL CARE, AND HEATING?: NOT HARD AT ALL

## 2023-09-17 SDOH — ECONOMIC STABILITY: FOOD INSECURITY: WITHIN THE PAST 12 MONTHS, YOU WORRIED THAT YOUR FOOD WOULD RUN OUT BEFORE YOU GOT MONEY TO BUY MORE.: NEVER TRUE

## 2023-09-17 SDOH — ECONOMIC STABILITY: HOUSING INSECURITY
IN THE LAST 12 MONTHS, WAS THERE A TIME WHEN YOU DID NOT HAVE A STEADY PLACE TO SLEEP OR SLEPT IN A SHELTER (INCLUDING NOW)?: NO

## 2023-09-19 ENCOUNTER — HOSPITAL ENCOUNTER (OUTPATIENT)
Facility: HOSPITAL | Age: 48
Discharge: HOME | End: 2023-09-22

## 2023-09-19 DIAGNOSIS — R73.03 PREDIABETES: ICD-10-CM

## 2023-09-19 DIAGNOSIS — I10 ESSENTIAL (PRIMARY) HYPERTENSION: ICD-10-CM

## 2023-09-19 LAB
ANION GAP SERPL CALC-SCNC: 4 MMOL/L (ref 3–18)
BUN SERPL-MCNC: 13 MG/DL (ref 7–18)
BUN/CREAT SERPL: 13 (ref 12–20)
CALCIUM SERPL-MCNC: 8.5 MG/DL (ref 8.5–10.1)
CHLORIDE SERPL-SCNC: 109 MMOL/L (ref 100–111)
CO2 SERPL-SCNC: 27 MMOL/L (ref 21–32)
CREAT SERPL-MCNC: 1.02 MG/DL (ref 0.6–1.3)
GLUCOSE SERPL-MCNC: 95 MG/DL (ref 74–99)
HBA1C MFR BLD: 5.3 % (ref 4.2–5.6)
HCV AB SER IA-ACNC: 0.05 INDEX
HCV AB SERPL QL IA: NEGATIVE
HEPATITIS C COMMENT: NORMAL
POTASSIUM SERPL-SCNC: 4.5 MMOL/L (ref 3.5–5.5)
SODIUM SERPL-SCNC: 140 MMOL/L (ref 136–145)

## 2023-09-19 PROCEDURE — 83036 HEMOGLOBIN GLYCOSYLATED A1C: CPT

## 2023-09-19 PROCEDURE — 36415 COLL VENOUS BLD VENIPUNCTURE: CPT

## 2023-09-19 PROCEDURE — 86803 HEPATITIS C AB TEST: CPT

## 2023-09-19 PROCEDURE — 80048 BASIC METABOLIC PNL TOTAL CA: CPT

## 2023-09-20 ENCOUNTER — OFFICE VISIT (OUTPATIENT)
Facility: CLINIC | Age: 48
End: 2023-09-20

## 2023-09-20 VITALS
HEIGHT: 69 IN | SYSTOLIC BLOOD PRESSURE: 129 MMHG | RESPIRATION RATE: 16 BRPM | TEMPERATURE: 98.1 F | OXYGEN SATURATION: 100 % | HEART RATE: 70 BPM | WEIGHT: 288.6 LBS | DIASTOLIC BLOOD PRESSURE: 87 MMHG | BODY MASS INDEX: 42.75 KG/M2

## 2023-09-20 DIAGNOSIS — I10 ESSENTIAL (PRIMARY) HYPERTENSION: Primary | ICD-10-CM

## 2023-09-20 DIAGNOSIS — Z12.11 COLON CANCER SCREENING: ICD-10-CM

## 2023-09-20 DIAGNOSIS — G43.819 OTHER MIGRAINE, INTRACTABLE, WITHOUT STATUS MIGRAINOSUS: ICD-10-CM

## 2023-09-20 DIAGNOSIS — Z23 ENCOUNTER FOR IMMUNIZATION: ICD-10-CM

## 2023-09-20 ASSESSMENT — ENCOUNTER SYMPTOMS
EYE DISCHARGE: 0
EYE REDNESS: 0
ABDOMINAL PAIN: 0
BACK PAIN: 0
CONSTIPATION: 0
SHORTNESS OF BREATH: 0
COLOR CHANGE: 0
DIARRHEA: 0
EYE ITCHING: 0
VOMITING: 0
FACIAL SWELLING: 0
EYE PAIN: 0

## 2023-09-20 ASSESSMENT — PATIENT HEALTH QUESTIONNAIRE - PHQ9
SUM OF ALL RESPONSES TO PHQ QUESTIONS 1-9: 1
1. LITTLE INTEREST OR PLEASURE IN DOING THINGS: 0
SUM OF ALL RESPONSES TO PHQ QUESTIONS 1-9: 1
2. FEELING DOWN, DEPRESSED OR HOPELESS: 1
SUM OF ALL RESPONSES TO PHQ9 QUESTIONS 1 & 2: 1

## 2023-09-20 NOTE — PROGRESS NOTES
Iza Salas is a 53 y.o.  female and presents with    Chief Complaint   Patient presents with    Hypertension    Results           Subjective:  Hypertension follow up:  Taking medications as prescribed: Yes  Checking BP at home: No  Symptoms: none  Low sodium diet: Yes  Exercise: No    She has been taking her BP meds at night and migraine in the day. She has continued to feel more tired. She denies having headaches.      Patient Active Problem List   Diagnosis    TIA (transient ischemic attack)    Migraine      Past Medical History:   Diagnosis Date    Hypertension     Migraines     TIA (transient ischemic attack)       Past Surgical History:   Procedure Laterality Date    [address]    two times    HYSTERECTOMY (CERVIX STATUS UNKNOWN)        Family History   Problem Relation Age of Onset    Diabetes Mother     Gout Mother     Heart Attack Mother     High Blood Pressure Mother     High Cholesterol Mother     Kidney Disease Mother     Heart Attack Father     High Blood Pressure Father     Colon Cancer Father     Breast Cancer Maternal Aunt             Vision Loss Maternal Grandmother     Breast Cancer Paternal Grandmother             Hypertension Other         mother side of family    Diabetes Other         mother side of family    Cancer Other         mother side of family     Social History     Socioeconomic History    Marital status: Life Partner     Spouse name: Not on file    Number of children: Not on file    Years of education: Not on file    Highest education level: Not on file   Occupational History    Not on file   Tobacco Use    Smoking status: Every Day     Packs/day: .5     Types: Cigarettes     Last attempt to quit: 2020     Years since quittin.7    Smokeless tobacco: Never   Substance and Sexual Activity    Alcohol use: Not Currently    Drug use: No    Sexual activity: Not Currently     Partners: Female   Other Topics Concern    Not on file

## 2023-09-20 NOTE — PROGRESS NOTES
Roque Howell is a 53 y.o. presents today for   Chief Complaint   Patient presents with    Hypertension     Is someone accompanying this pt? No     Is the patient using any DME equipment during OV? No     Health Maintenance Due   Topic Date Due    Hepatitis B vaccine (1 of 3 - 3-dose series) Never done    Pneumococcal 0-64 years Vaccine (1 - PCV) Never done    DTaP/Tdap/Td vaccine (1 - Tdap) Never done    COVID-19 Vaccine (3 - Moderna series) 04/17/2021    Colorectal Cancer Screen  Never done    Flu vaccine (1) 08/01/2023         Coordination of Care:   1. \"Have you been to the ER, urgent care clinic since your last visit? Hospitalized since your last visit? \" No    2. \"Have you seen or consulted any other health care providers outside of the [address] [place] [address] since your last visit? \" No     3. For patients aged 43-73: Has the patient had a colonoscopy / FIT/ Cologuard? No      If the patient is female:    4. For patients aged 43-66: Has the patient had a mammogram within the past 2 years? Yes       5. For patients aged 21-65: Has the patient had a pap smear? Yes - no Care Gap present    After obtaining consent, and per orders of PCP, injection of Flu given in Left deltoid by Braden Sow MA. Patient instructed to remain in clinic for 10 minutes afterwards, and to report any adverse reaction to me immediately.       Braden Sow CMA

## 2023-10-01 LAB — NONINV COLON CA DNA+OCC BLD SCRN STL QL: NEGATIVE

## 2023-10-19 DIAGNOSIS — G43.819 OTHER MIGRAINE, INTRACTABLE, WITHOUT STATUS MIGRAINOSUS: Primary | ICD-10-CM

## 2023-10-19 RX ORDER — PROPRANOLOL HYDROCHLORIDE 40 MG/1
40 TABLET ORAL 3 TIMES DAILY
Qty: 90 TABLET | Refills: 3 | Status: SHIPPED | OUTPATIENT
Start: 2023-10-19

## 2023-11-01 ENCOUNTER — APPOINTMENT (OUTPATIENT)
Facility: HOSPITAL | Age: 48
End: 2023-11-01
Payer: COMMERCIAL

## 2023-11-01 ENCOUNTER — HOSPITAL ENCOUNTER (EMERGENCY)
Facility: HOSPITAL | Age: 48
Discharge: HOME | End: 2023-11-01
Payer: COMMERCIAL

## 2023-11-01 VITALS
WEIGHT: 250 LBS | BODY MASS INDEX: 37.03 KG/M2 | RESPIRATION RATE: 16 BRPM | DIASTOLIC BLOOD PRESSURE: 92 MMHG | HEIGHT: 69 IN | HEART RATE: 56 BPM | SYSTOLIC BLOOD PRESSURE: 142 MMHG | OXYGEN SATURATION: 100 % | TEMPERATURE: 97.4 F

## 2023-11-01 DIAGNOSIS — R07.9 CHEST PAIN, UNSPECIFIED TYPE: Primary | ICD-10-CM

## 2023-11-01 DIAGNOSIS — R93.89 ABNORMAL CT OF THE CHEST: ICD-10-CM

## 2023-11-01 LAB
ALBUMIN SERPL-MCNC: 3.4 G/DL (ref 3.4–5)
ALBUMIN/GLOB SERPL: 1 (ref 0.8–1.7)
ALP SERPL-CCNC: 128 U/L (ref 45–117)
ALT SERPL-CCNC: 16 U/L (ref 13–56)
ANION GAP SERPL CALC-SCNC: 2 MMOL/L (ref 3–18)
AST SERPL-CCNC: 21 U/L (ref 10–38)
BASOPHILS # BLD: 0.1 K/UL (ref 0–0.1)
BASOPHILS NFR BLD: 0 % (ref 0–2)
BILIRUB SERPL-MCNC: 0.5 MG/DL (ref 0.2–1)
BUN SERPL-MCNC: 13 MG/DL (ref 7–18)
BUN/CREAT SERPL: 12 (ref 12–20)
CALCIUM SERPL-MCNC: 8.8 MG/DL (ref 8.5–10.1)
CHLORIDE SERPL-SCNC: 108 MMOL/L (ref 100–111)
CO2 SERPL-SCNC: 30 MMOL/L (ref 21–32)
CREAT SERPL-MCNC: 1.11 MG/DL (ref 0.6–1.3)
D DIMER PPP FEU-MCNC: 1.31 UG/ML(FEU)
DIFFERENTIAL METHOD BLD: NORMAL
EOSINOPHIL # BLD: 0.3 K/UL (ref 0–0.4)
EOSINOPHIL NFR BLD: 2 % (ref 0–5)
ERYTHROCYTE [DISTWIDTH] IN BLOOD BY AUTOMATED COUNT: 13 % (ref 11.6–14.5)
GLOBULIN SER CALC-MCNC: 3.4 G/DL (ref 2–4)
GLUCOSE SERPL-MCNC: 105 MG/DL (ref 74–99)
HCT VFR BLD AUTO: 44.5 % (ref 35–45)
HGB BLD-MCNC: 14.4 G/DL (ref 12–16)
IMM GRANULOCYTES # BLD AUTO: 0 K/UL (ref 0–0.04)
IMM GRANULOCYTES NFR BLD AUTO: 0 % (ref 0–0.5)
LYMPHOCYTES # BLD: 2.9 K/UL (ref 0.9–3.6)
LYMPHOCYTES NFR BLD: 25 % (ref 21–52)
MAGNESIUM SERPL-MCNC: 2 MG/DL (ref 1.6–2.6)
MCH RBC QN AUTO: 28.1 PG (ref 24–34)
MCHC RBC AUTO-ENTMCNC: 32.4 G/DL (ref 31–37)
MCV RBC AUTO: 86.7 FL (ref 78–100)
MONOCYTES # BLD: 0.9 K/UL (ref 0.05–1.2)
MONOCYTES NFR BLD: 8 % (ref 3–10)
NEUTS SEG # BLD: 7.7 K/UL (ref 1.8–8)
NEUTS SEG NFR BLD: 65 % (ref 40–73)
NRBC # BLD: 0 K/UL (ref 0–0.01)
NRBC BLD-RTO: 0 PER 100 WBC
PLATELET # BLD AUTO: 187 K/UL (ref 135–420)
PMV BLD AUTO: 10.8 FL (ref 9.2–11.8)
POTASSIUM SERPL-SCNC: 4.4 MMOL/L (ref 3.5–5.5)
PROT SERPL-MCNC: 6.8 G/DL (ref 6.4–8.2)
RBC # BLD AUTO: 5.13 M/UL (ref 4.2–5.3)
SODIUM SERPL-SCNC: 140 MMOL/L (ref 136–145)
TROPONIN I SERPL HS-MCNC: 16 NG/L (ref 0–54)
TROPONIN I SERPL HS-MCNC: 17 NG/L (ref 0–54)
WBC # BLD AUTO: 11.8 K/UL (ref 4.6–13.2)

## 2023-11-01 PROCEDURE — 71046 X-RAY EXAM CHEST 2 VIEWS: CPT

## 2023-11-01 PROCEDURE — 93970 EXTREMITY STUDY: CPT

## 2023-11-01 PROCEDURE — 71275 CT ANGIOGRAPHY CHEST: CPT

## 2023-11-01 PROCEDURE — 84484 ASSAY OF TROPONIN QUANT: CPT

## 2023-11-01 PROCEDURE — 85025 COMPLETE CBC W/AUTO DIFF WBC: CPT

## 2023-11-01 PROCEDURE — 99285 EMERGENCY DEPT VISIT HI MDM: CPT

## 2023-11-01 PROCEDURE — 85379 FIBRIN DEGRADATION QUANT: CPT

## 2023-11-01 PROCEDURE — 6370000000 HC RX 637 (ALT 250 FOR IP): Performed by: EMERGENCY MEDICINE

## 2023-11-01 PROCEDURE — 80053 COMPREHEN METABOLIC PANEL: CPT

## 2023-11-01 PROCEDURE — 96374 THER/PROPH/DIAG INJ IV PUSH: CPT

## 2023-11-01 PROCEDURE — 83735 ASSAY OF MAGNESIUM: CPT

## 2023-11-01 PROCEDURE — 6360000004 HC RX CONTRAST MEDICATION: Performed by: EMERGENCY MEDICINE

## 2023-11-01 PROCEDURE — 6360000002 HC RX W HCPCS: Performed by: EMERGENCY MEDICINE

## 2023-11-01 PROCEDURE — 93005 ELECTROCARDIOGRAM TRACING: CPT | Performed by: EMERGENCY MEDICINE

## 2023-11-01 RX ORDER — ASPIRIN 81 MG/1
324 TABLET, CHEWABLE ORAL
Status: COMPLETED | OUTPATIENT
Start: 2023-11-01 | End: 2023-11-01

## 2023-11-01 RX ORDER — KETOROLAC TROMETHAMINE 15 MG/ML
15 INJECTION, SOLUTION INTRAMUSCULAR; INTRAVENOUS
Status: COMPLETED | OUTPATIENT
Start: 2023-11-01 | End: 2023-11-01

## 2023-11-01 RX ADMIN — ASPIRIN 324 MG: 81 TABLET, CHEWABLE ORAL at 14:57

## 2023-11-01 RX ADMIN — IOPAMIDOL 100 ML: 755 INJECTION, SOLUTION INTRAVENOUS at 17:25

## 2023-11-01 RX ADMIN — KETOROLAC TROMETHAMINE 15 MG: 15 INJECTION, SOLUTION INTRAMUSCULAR; INTRAVENOUS at 18:42

## 2023-11-01 ASSESSMENT — ENCOUNTER SYMPTOMS
ALLERGIC/IMMUNOLOGIC NEGATIVE: 1
ABDOMINAL PAIN: 0
GASTROINTESTINAL NEGATIVE: 1
SHORTNESS OF BREATH: 0
BACK PAIN: 1
EYES NEGATIVE: 1

## 2023-11-01 ASSESSMENT — PAIN SCALES - GENERAL: PAINLEVEL_OUTOF10: 10

## 2023-11-01 NOTE — ED PROVIDER NOTES
(5' 9\")        Troponins negative for any kind of ACS concern at this time. No arrhythmia on EKG. CTA showed no PE small pulmonary nodule for her to follow-up with her primary care provider given cardiology referral.  No DVT on PVL. Medical Decision Making  Amount and/or Complexity of Data Reviewed  Labs: ordered. Radiology: ordered. Risk  OTC drugs. Prescription drug management. REASSESSMENT        FINAL IMPRESSION      1. Chest pain, unspecified type    2.  Abnormal CT of the chest          DISPOSITION/PLAN   DISPOSITION Decision To Discharge 11/01/2023 06:08:03 PM      PATIENT REFERRED TO:  Chriss Salazar MD  [address]  [address] [facility] [address] [phone]          [address]  [address] [place] [id number] [phone]            DISCHARGE MEDICATIONS:  New Prescriptions    No medications on file     Controlled Substances Monitoring:          No data to display                (Please note that portions of this note were completed with a voice recognition program.  Efforts were made to edit the dictations but occasionally words are mis-transcribed.)    PRABHA Castillo (electronically signed)  Attending Emergency Physician           Keren Wilson  11/01/23 4744

## 2023-11-01 NOTE — ED TRIAGE NOTES
Pt arrives c/o CP that started Sunday. Pt states pain radiates to her shoulder. Pt pain is localized over her chest on assessment. A&Ox4.

## 2023-11-02 LAB
ECHO BSA: 2.35 M2
EKG ATRIAL RATE: 70 BPM
EKG DIAGNOSIS: NORMAL
EKG P AXIS: 39 DEGREES
EKG P-R INTERVAL: 200 MS
EKG Q-T INTERVAL: 384 MS
EKG QRS DURATION: 90 MS
EKG QTC CALCULATION (BAZETT): 414 MS
EKG R AXIS: -3 DEGREES
EKG T AXIS: 48 DEGREES
EKG VENTRICULAR RATE: 70 BPM

## 2023-11-02 PROCEDURE — 93010 ELECTROCARDIOGRAM REPORT: CPT | Performed by: INTERNAL MEDICINE

## 2023-11-17 ENCOUNTER — OFFICE VISIT (OUTPATIENT)
Facility: CLINIC | Age: 48
End: 2023-11-17
Payer: COMMERCIAL

## 2023-11-17 VITALS
BODY MASS INDEX: 42.75 KG/M2 | OXYGEN SATURATION: 99 % | HEART RATE: 85 BPM | SYSTOLIC BLOOD PRESSURE: 122 MMHG | WEIGHT: 288.6 LBS | TEMPERATURE: 97.6 F | HEIGHT: 69 IN | DIASTOLIC BLOOD PRESSURE: 81 MMHG | RESPIRATION RATE: 16 BRPM

## 2023-11-17 DIAGNOSIS — N64.4 BREAST PAIN, LEFT: Primary | ICD-10-CM

## 2023-11-17 PROCEDURE — 99214 OFFICE O/P EST MOD 30 MIN: CPT | Performed by: STUDENT IN AN ORGANIZED HEALTH CARE EDUCATION/TRAINING PROGRAM

## 2023-11-17 RX ORDER — CYCLOBENZAPRINE HCL 10 MG
10 TABLET ORAL 3 TIMES DAILY PRN
Qty: 30 TABLET | Refills: 0 | Status: SHIPPED | OUTPATIENT
Start: 2023-11-17 | End: 2023-11-27

## 2023-11-17 ASSESSMENT — ENCOUNTER SYMPTOMS
EYE REDNESS: 0
CONSTIPATION: 0
VOMITING: 0
COLOR CHANGE: 0
DIARRHEA: 0
EYE PAIN: 0
SHORTNESS OF BREATH: 0
ABDOMINAL PAIN: 0
EYE DISCHARGE: 0
FACIAL SWELLING: 0
BACK PAIN: 0
EYE ITCHING: 0

## 2023-11-17 ASSESSMENT — PATIENT HEALTH QUESTIONNAIRE - PHQ9
SUM OF ALL RESPONSES TO PHQ QUESTIONS 1-9: 0
1. LITTLE INTEREST OR PLEASURE IN DOING THINGS: 0
SUM OF ALL RESPONSES TO PHQ9 QUESTIONS 1 & 2: 0
SUM OF ALL RESPONSES TO PHQ QUESTIONS 1-9: 0
SUM OF ALL RESPONSES TO PHQ QUESTIONS 1-9: 0
2. FEELING DOWN, DEPRESSED OR HOPELESS: 0
SUM OF ALL RESPONSES TO PHQ QUESTIONS 1-9: 0

## 2023-11-17 NOTE — PROGRESS NOTES
Christ Dean is a 53 y.o.  female and presents with    Chief Complaint   Patient presents with    Follow-Up from Hospital     Chest pain            Subjective:    Has been having chest pain on the L side of her chest.  IT is right above her L breast, and into her L axilla. This started at the end of Oct. She went to the ED. She had CTA and DVT screening which was negative. She has family hx of breast cancer. Her paternal grandmother and her 2 maternal aunts had breast cancer. For her migraine she has been taking only propanolol 40mg once a day and has more energy and her HA are back to being resolved.      Patient Active Problem List   Diagnosis    TIA (transient ischemic attack)    Migraine      Past Medical History:   Diagnosis Date    Hypertension     Migraines     TIA (transient ischemic attack)       Past Surgical History:   Procedure Laterality Date    [address]    two times    HYSTERECTOMY (CERVIX STATUS UNKNOWN)        Family History   Problem Relation Age of Onset    Diabetes Mother     Gout Mother     Heart Attack Mother     High Blood Pressure Mother     High Cholesterol Mother     Kidney Disease Mother     Heart Attack Father     High Blood Pressure Father     Colon Cancer Father     Breast Cancer Maternal Aunt             Vision Loss Maternal Grandmother     Breast Cancer Paternal Grandmother             Hypertension Other         mother side of family    Diabetes Other         mother side of family    Cancer Other         mother side of family     Social History     Socioeconomic History    Marital status: Life Partner     Spouse name: Not on file    Number of children: Not on file    Years of education: Not on file    Highest education level: Not on file   Occupational History    Not on file   Tobacco Use    Smoking status: Every Day     Packs/day: .5     Types: Cigarettes     Last attempt to quit: 2020     Years since quittin.9

## 2023-11-17 NOTE — PROGRESS NOTES
Vincent Barrera is a 53 y.o. presents today for   Chief Complaint   Patient presents with    Follow-Up from Hospital     Chest pain      Is someone accompanying this pt? No      Is the patient using any DME equipment during OV? No      Health Maintenance Due   Topic Date Due    Hepatitis B vaccine (1 of 3 - 3-dose series) Never done    Pneumococcal 0-64 years Vaccine (1 - PCV) Never done    DTaP/Tdap/Td vaccine (1 - Tdap) Never done    COVID-19 Vaccine (3 - 2023-24 season) 09/01/2023         Coordination of Care:   1. \"Have you been to the ER, urgent care clinic since your last visit? Hospitalized since your last visit? \" Yes ED chest pain     2. \"Have you seen or consulted any other health care providers outside of the [address] [place] [address] since your last visit? \" No     3. For patients aged 43-73: Has the patient had a colonoscopy / FIT/ Cologuard? Yes - no Care Gap present      If the patient is female:    4. For patients aged 43-66: Has the patient had a mammogram within the past 2 years? Yes - no Care Gap present      5. For patients aged 21-65: Has the patient had a pap smear?  Yes - no Care Gap present    Thania Pierre

## 2023-11-22 ENCOUNTER — HOSPITAL ENCOUNTER (OUTPATIENT)
Dept: WOMENS IMAGING | Facility: HOSPITAL | Age: 48
Discharge: HOME | End: 2023-11-25
Attending: STUDENT IN AN ORGANIZED HEALTH CARE EDUCATION/TRAINING PROGRAM
Payer: COMMERCIAL

## 2023-11-22 ENCOUNTER — HOSPITAL ENCOUNTER (OUTPATIENT)
Facility: HOSPITAL | Age: 48
Discharge: HOME | End: 2023-11-25
Attending: STUDENT IN AN ORGANIZED HEALTH CARE EDUCATION/TRAINING PROGRAM
Payer: COMMERCIAL

## 2023-11-22 DIAGNOSIS — N64.4 BREAST PAIN, LEFT: ICD-10-CM

## 2023-11-22 PROCEDURE — G0279 TOMOSYNTHESIS, MAMMO: HCPCS

## 2023-11-22 PROCEDURE — 76642 ULTRASOUND BREAST LIMITED: CPT

## 2023-12-22 NOTE — TELEPHONE ENCOUNTER
Called patient gave scheduled time for test at [facility]. Patient states understanding.
Statement Selected

## 2024-01-02 ENCOUNTER — OFFICE VISIT (OUTPATIENT)
Facility: CLINIC | Age: 49
End: 2024-01-02

## 2024-01-02 VITALS
RESPIRATION RATE: 17 BRPM | HEART RATE: 86 BPM | WEIGHT: 289 LBS | HEIGHT: 69 IN | BODY MASS INDEX: 42.8 KG/M2 | DIASTOLIC BLOOD PRESSURE: 89 MMHG | SYSTOLIC BLOOD PRESSURE: 124 MMHG | OXYGEN SATURATION: 98 % | TEMPERATURE: 97.9 F

## 2024-01-02 DIAGNOSIS — I10 ESSENTIAL (PRIMARY) HYPERTENSION: Primary | ICD-10-CM

## 2024-01-02 DIAGNOSIS — L30.4 INTERTRIGO: ICD-10-CM

## 2024-01-02 DIAGNOSIS — G43.011 INTRACTABLE MIGRAINE WITHOUT AURA AND WITH STATUS MIGRAINOSUS: ICD-10-CM

## 2024-01-02 PROCEDURE — 3079F DIAST BP 80-89 MM HG: CPT | Performed by: STUDENT IN AN ORGANIZED HEALTH CARE EDUCATION/TRAINING PROGRAM

## 2024-01-02 PROCEDURE — 99214 OFFICE O/P EST MOD 30 MIN: CPT | Performed by: STUDENT IN AN ORGANIZED HEALTH CARE EDUCATION/TRAINING PROGRAM

## 2024-01-02 PROCEDURE — 3074F SYST BP LT 130 MM HG: CPT | Performed by: STUDENT IN AN ORGANIZED HEALTH CARE EDUCATION/TRAINING PROGRAM

## 2024-01-02 RX ORDER — AMLODIPINE BESYLATE 2.5 MG/1
2.5 TABLET ORAL DAILY
Qty: 30 TABLET | Refills: 5 | Status: SHIPPED | OUTPATIENT
Start: 2024-01-02

## 2024-01-02 RX ORDER — KETOCONAZOLE 20 MG/G
CREAM TOPICAL DAILY
Qty: 30 G | Refills: 2 | Status: SHIPPED | OUTPATIENT
Start: 2024-01-02

## 2024-01-02 ASSESSMENT — ENCOUNTER SYMPTOMS
VOMITING: 0
FACIAL SWELLING: 0
EYE PAIN: 0
BACK PAIN: 0
SHORTNESS OF BREATH: 0
EYE ITCHING: 0
CONSTIPATION: 0
EYE REDNESS: 0
EYE DISCHARGE: 0
COLOR CHANGE: 0
ABDOMINAL PAIN: 0
DIARRHEA: 0

## 2024-01-02 ASSESSMENT — PATIENT HEALTH QUESTIONNAIRE - PHQ9
SUM OF ALL RESPONSES TO PHQ QUESTIONS 1-9: 0
SUM OF ALL RESPONSES TO PHQ QUESTIONS 1-9: 0
2. FEELING DOWN, DEPRESSED OR HOPELESS: 0
SUM OF ALL RESPONSES TO PHQ9 QUESTIONS 1 & 2: 0
1. LITTLE INTEREST OR PLEASURE IN DOING THINGS: 0
SUM OF ALL RESPONSES TO PHQ QUESTIONS 1-9: 0
SUM OF ALL RESPONSES TO PHQ QUESTIONS 1-9: 0

## 2024-01-02 NOTE — PROGRESS NOTES
Rachelle Corrales is a 48 y.o.  female and presents with    Chief Complaint   Patient presents with    Follow-up           Subjective:    Hypertension follow up:  Taking medications as prescribed: Not on a consistent basis.   Checking BP at home: No  Symptoms: none  Low sodium diet: Yes  Exercise: No    Has been dealing with a cold.     Has not been complaint with taking the pills for her migraines. She states she has not been having them at this time.     Patient Active Problem List   Diagnosis    TIA (transient ischemic attack)    Migraine      Past Medical History:   Diagnosis Date    Hypertension     Migraines     TIA (transient ischemic attack)       Past Surgical History:   Procedure Laterality Date     SECTION   &     two times    HYSTERECTOMY (CERVIX STATUS UNKNOWN)        Family History   Problem Relation Age of Onset    Diabetes Mother     Gout Mother     Heart Attack Mother     High Blood Pressure Mother     High Cholesterol Mother     Kidney Disease Mother     Heart Attack Father     High Blood Pressure Father     Colon Cancer Father     Breast Cancer Maternal Aunt             Vision Loss Maternal Grandmother     Breast Cancer Paternal Grandmother             Hypertension Other         mother side of family    Diabetes Other         mother side of family    Cancer Other         mother side of family     Social History     Socioeconomic History    Marital status: Life Partner     Spouse name: Not on file    Number of children: Not on file    Years of education: Not on file    Highest education level: Not on file   Occupational History    Not on file   Tobacco Use    Smoking status: Every Day     Current packs/day: 0.00     Types: Cigarettes     Last attempt to quit: 2020     Years since quitting: 3.0    Smokeless tobacco: Never   Substance and Sexual Activity    Alcohol use: Not Currently    Drug use: No    Sexual activity: Not Currently     Partners:

## 2024-01-02 NOTE — PROGRESS NOTES
Rachelle Corrales is a 48 y.o. year old female who presents today for   Chief Complaint   Patient presents with    Follow-up       Is someone accompanying this pt? no    Is the patient using any DME equipment during OV? no    Depression Screenin/17/2023     1:08 PM 2023     2:18 PM 3/20/2023     2:20 PM 2022     1:53 PM   PHQ-9 Questionaire   Little interest or pleasure in doing things 0 0 0 0   Feeling down, depressed, or hopeless 0 1 1 0   PHQ-9 Total Score 0 1 1 0       Abuse Screenin/2/2024    11:00 AM   AMB Abuse Screening   Do you ever feel afraid of your partner? N   Are you in a relationship with someone who physically or mentally threatens you? N   Is it safe for you to go home? Y       Learning Assessment:  No question data found.    Fall Risk:       No data to display                    Coordination of Care:   1. \"Have you been to the ER, urgent care clinic since your last visit?  Hospitalized since your last visit?\" no    2. \"Have you seen or consulted any other health care providers outside of the [facility] since your last visit?\" no    3. For patients aged 45-75: Has the patient had a colonoscopy / FIT/ Cologuard? NA    If the patient is female:    4. For patients aged 40-74: Has the patient had a mammogram within the past 2 years? Not due    5. For patients aged 21-65: Has the patient had a pap smear? N/A    Health Maintenance: reviewed and discussed and ordered per Provider.    Health Maintenance Due   Topic Date Due    Hepatitis B vaccine (1 of 3 - 3-dose series) Never done    Pneumococcal 0-64 years Vaccine (1 - PCV) Never done    DTaP/Tdap/Td vaccine (1 - Tdap) Never done    COVID-19 Vaccine (3 - - season) 2023        - Sari Ramirez LPN  [facility]  Phone: [phone]  Fax: [phone]

## 2024-03-20 ENCOUNTER — OFFICE VISIT (OUTPATIENT)
Facility: CLINIC | Age: 49
End: 2024-03-20
Payer: COMMERCIAL

## 2024-03-20 VITALS
HEART RATE: 70 BPM | TEMPERATURE: 97.9 F | OXYGEN SATURATION: 99 % | SYSTOLIC BLOOD PRESSURE: 135 MMHG | DIASTOLIC BLOOD PRESSURE: 90 MMHG | RESPIRATION RATE: 15 BRPM | WEIGHT: 288.6 LBS | BODY MASS INDEX: 42.75 KG/M2 | HEIGHT: 69 IN

## 2024-03-20 DIAGNOSIS — I10 ESSENTIAL (PRIMARY) HYPERTENSION: ICD-10-CM

## 2024-03-20 DIAGNOSIS — G43.819 OTHER MIGRAINE, INTRACTABLE, WITHOUT STATUS MIGRAINOSUS: ICD-10-CM

## 2024-03-20 DIAGNOSIS — J01.80 ACUTE NON-RECURRENT SINUSITIS OF OTHER SINUS: Primary | ICD-10-CM

## 2024-03-20 PROCEDURE — 3075F SYST BP GE 130 - 139MM HG: CPT | Performed by: STUDENT IN AN ORGANIZED HEALTH CARE EDUCATION/TRAINING PROGRAM

## 2024-03-20 PROCEDURE — 3080F DIAST BP >= 90 MM HG: CPT | Performed by: STUDENT IN AN ORGANIZED HEALTH CARE EDUCATION/TRAINING PROGRAM

## 2024-03-20 PROCEDURE — 99214 OFFICE O/P EST MOD 30 MIN: CPT | Performed by: STUDENT IN AN ORGANIZED HEALTH CARE EDUCATION/TRAINING PROGRAM

## 2024-03-20 RX ORDER — AMLODIPINE BESYLATE 2.5 MG/1
2.5 TABLET ORAL DAILY
Qty: 90 TABLET | Refills: 1 | Status: SHIPPED | OUTPATIENT
Start: 2024-03-20

## 2024-03-20 ASSESSMENT — ENCOUNTER SYMPTOMS
DIARRHEA: 0
FACIAL SWELLING: 0
ABDOMINAL PAIN: 0
EYE ITCHING: 0
EYE PAIN: 0
BACK PAIN: 0
COLOR CHANGE: 0
SHORTNESS OF BREATH: 0
EYE DISCHARGE: 0
EYE REDNESS: 0
VOMITING: 0
CONSTIPATION: 0

## 2024-03-20 NOTE — PROGRESS NOTES
Rachelle Corrales is a 48 y.o. year old female who presents today for   Chief Complaint   Patient presents with    Hypertension       Is someone accompanying this pt? no    Is the patient using any DME equipment during OV? no    Depression Screenin/2/2024    11:34 AM 2023     1:08 PM 2023     2:18 PM 3/20/2023     2:20 PM 2022     1:53 PM   PHQ-9 Questionaire   Little interest or pleasure in doing things 0 0 0 0 0   Feeling down, depressed, or hopeless 0 0 1 1 0   PHQ-9 Total Score 0 0 1 1 0       Abuse Screenin/2/2024    11:00 AM   AMB Abuse Screening   Do you ever feel afraid of your partner? N   Are you in a relationship with someone who physically or mentally threatens you? N   Is it safe for you to go home? Y       Learning Assessment:  No question data found.    Fall Risk:       No data to display                    Coordination of Care:   1. \"Have you been to the ER, urgent care clinic since your last visit?  Hospitalized since your last visit?\" no    2. \"Have you seen or consulted any other health care providers outside of the [facility] since your last visit?\" no    3. For patients aged 45-75: Has the patient had a colonoscopy / FIT/ Cologuard? Not due    If the patient is female:    4. For patients aged 40-74: Has the patient had a mammogram within the past 2 years? NA    5. For patients aged 21-65: Has the patient had a pap smear? NA    Health Maintenance: reviewed and discussed and ordered per Provider.    Health Maintenance Due   Topic Date Due    Hepatitis B vaccine (1 of 3 - 3-dose series) Never done    Pneumococcal 0-64 years Vaccine (1 of 2 - PCV) Never done    DTaP/Tdap/Td vaccine (1 - Tdap) Never done    COVID-19 Vaccine (3 -  season) 2023        - Sari Ramirez LPN  [facility]  Phone: [phone]  Fax: [phone]  
numbness and headaches.   Psychiatric/Behavioral:  Negative for agitation, behavioral problems, decreased concentration, sleep disturbance and suicidal ideas.              Objective:  Vitals:    03/20/24 1313 03/20/24 1314   BP: (!) 159/98 (!) 151/99   Pulse: 69    Resp: 15    Temp: 97.9 °F (36.6 °C)    TempSrc: Temporal    SpO2: 98%    Weight: 130.9 kg (288 lb 9.6 oz)    Height: 1.753 m (5' 9\")          Physical Exam  Vitals reviewed.   Constitutional:       Appearance: She is obese.   HENT:      Head: Normocephalic.      Nose: No congestion or rhinorrhea.   Eyes:      General: No scleral icterus.        Right eye: No discharge.         Left eye: No discharge.   Cardiovascular:      Rate and Rhythm: Normal rate and regular rhythm.   Pulmonary:      Effort: Pulmonary effort is normal.      Breath sounds: Normal breath sounds.   Abdominal:      General: Abdomen is flat.      Palpations: Abdomen is soft.   Musculoskeletal:         General: Normal range of motion.      Cervical back: Normal range of motion and neck supple. No rigidity.   Skin:     General: Skin is warm and dry.   Neurological:      Mental Status: She is alert and oriented to person, place, and time.      Gait: Gait normal.   Psychiatric:         Mood and Affect: Mood normal.         Behavior: Behavior normal.         Thought Content: Thought content normal.         Judgment: Judgment normal.           LABS     TESTS      Assessment/Plan:    1. Essential (primary) hypertension  Likely stable but d/t taking decongestant likely reason for BP being elevated  - amLODIPine (NORVASC) 2.5 MG tablet; Take 1 tablet by mouth daily  Dispense: 90 tablet; Refill: 1    2. Other migraine, intractable, without status migrainosus  stable    3. Acute non-recurrent sinusitis of other sinus  Likely viral      Lab review: no lab studies available for review at time of visit    On this date 3/20/2024 I have spent 30 minutes reviewing previous notes, test results and face to

## 2024-03-27 DIAGNOSIS — G43.819 OTHER MIGRAINE, INTRACTABLE, WITHOUT STATUS MIGRAINOSUS: ICD-10-CM

## 2024-03-27 NOTE — TELEPHONE ENCOUNTER
Medication(s) requesting:   Requested Prescriptions     Pending Prescriptions Disp Refills    propranolol (INDERAL) 40 MG tablet 90 tablet 3     Sig: Take 1 tablet by mouth 3 times daily       Last office visit:  1/24/24  Next office visit DMA: 4/3/2024

## 2024-03-28 RX ORDER — PROPRANOLOL HYDROCHLORIDE 40 MG/1
40 TABLET ORAL 3 TIMES DAILY
Qty: 90 TABLET | Refills: 3 | Status: SHIPPED | OUTPATIENT
Start: 2024-03-28

## 2024-05-29 ENCOUNTER — APPOINTMENT (OUTPATIENT)
Facility: HOSPITAL | Age: 49
DRG: 199 | End: 2024-05-29
Payer: COMMERCIAL

## 2024-05-29 ENCOUNTER — HOSPITAL ENCOUNTER (INPATIENT)
Facility: HOSPITAL | Age: 49
LOS: 1 days | Discharge: HOME | DRG: 199 | End: 2024-05-30
Attending: EMERGENCY MEDICINE
Payer: COMMERCIAL

## 2024-05-29 DIAGNOSIS — R47.89 WORD FINDING DIFFICULTY: ICD-10-CM

## 2024-05-29 DIAGNOSIS — G45.9 TIA (TRANSIENT ISCHEMIC ATTACK): Primary | ICD-10-CM

## 2024-05-29 PROBLEM — I10 PRIMARY HYPERTENSION: Chronic | Status: ACTIVE | Noted: 2024-05-29

## 2024-05-29 LAB
ALBUMIN SERPL-MCNC: 3.3 G/DL (ref 3.4–5)
ALBUMIN/GLOB SERPL: 1 (ref 0.8–1.7)
ALP SERPL-CCNC: 128 U/L (ref 45–117)
ALT SERPL-CCNC: 14 U/L (ref 13–56)
AMPHET UR QL SCN: NEGATIVE
ANION GAP SERPL CALC-SCNC: 6 MMOL/L (ref 3–18)
AST SERPL-CCNC: 14 U/L (ref 10–38)
BARBITURATES UR QL SCN: NEGATIVE
BASOPHILS # BLD: 0.1 K/UL (ref 0–0.1)
BASOPHILS NFR BLD: 1 % (ref 0–2)
BENZODIAZ UR QL: NEGATIVE
BILIRUB SERPL-MCNC: 0.3 MG/DL (ref 0.2–1)
BUN SERPL-MCNC: 13 MG/DL (ref 7–18)
BUN/CREAT SERPL: 11 (ref 12–20)
CALCIUM SERPL-MCNC: 8.7 MG/DL (ref 8.5–10.1)
CANNABINOIDS UR QL SCN: NEGATIVE
CHLORIDE SERPL-SCNC: 105 MMOL/L (ref 100–111)
CO2 SERPL-SCNC: 26 MMOL/L (ref 21–32)
COCAINE UR QL SCN: NEGATIVE
CREAT SERPL-MCNC: 1.15 MG/DL (ref 0.6–1.3)
DIFFERENTIAL METHOD BLD: NORMAL
EOSINOPHIL # BLD: 0.2 K/UL (ref 0–0.4)
EOSINOPHIL NFR BLD: 2 % (ref 0–5)
ERYTHROCYTE [DISTWIDTH] IN BLOOD BY AUTOMATED COUNT: 13.6 % (ref 11.6–14.5)
ETHANOL SERPL-MCNC: <3 MG/DL (ref 0–3)
GLOBULIN SER CALC-MCNC: 3.3 G/DL (ref 2–4)
GLUCOSE BLD STRIP.AUTO-MCNC: 101 MG/DL (ref 70–110)
GLUCOSE BLD STRIP.AUTO-MCNC: 127 MG/DL (ref 70–110)
GLUCOSE SERPL-MCNC: 82 MG/DL (ref 74–99)
HCT VFR BLD AUTO: 42.9 % (ref 35–45)
HGB BLD-MCNC: 14 G/DL (ref 12–16)
IMM GRANULOCYTES # BLD AUTO: 0 K/UL (ref 0–0.04)
IMM GRANULOCYTES NFR BLD AUTO: 0 % (ref 0–0.5)
LYMPHOCYTES # BLD: 2.5 K/UL (ref 0.9–3.6)
LYMPHOCYTES NFR BLD: 24 % (ref 21–52)
Lab: NORMAL
MCH RBC QN AUTO: 28.5 PG (ref 24–34)
MCHC RBC AUTO-ENTMCNC: 32.6 G/DL (ref 31–37)
MCV RBC AUTO: 87.4 FL (ref 78–100)
METHADONE UR QL: NEGATIVE
MONOCYTES # BLD: 0.9 K/UL (ref 0.05–1.2)
MONOCYTES NFR BLD: 9 % (ref 3–10)
NEUTS SEG # BLD: 6.6 K/UL (ref 1.8–8)
NEUTS SEG NFR BLD: 64 % (ref 40–73)
NRBC # BLD: 0 K/UL (ref 0–0.01)
NRBC BLD-RTO: 0 PER 100 WBC
OPIATES UR QL: NEGATIVE
PCP UR QL: NEGATIVE
PLATELET # BLD AUTO: 188 K/UL (ref 135–420)
PMV BLD AUTO: 10.6 FL (ref 9.2–11.8)
POTASSIUM SERPL-SCNC: 4.1 MMOL/L (ref 3.5–5.5)
PROT SERPL-MCNC: 6.6 G/DL (ref 6.4–8.2)
RBC # BLD AUTO: 4.91 M/UL (ref 4.2–5.3)
SODIUM SERPL-SCNC: 137 MMOL/L (ref 136–145)
TROPONIN I SERPL HS-MCNC: 19 NG/L (ref 0–54)
WBC # BLD AUTO: 10.3 K/UL (ref 4.6–13.2)

## 2024-05-29 PROCEDURE — 99285 EMERGENCY DEPT VISIT HI MDM: CPT

## 2024-05-29 PROCEDURE — 85025 COMPLETE CBC W/AUTO DIFF WBC: CPT

## 2024-05-29 PROCEDURE — G0378 HOSPITAL OBSERVATION PER HR: HCPCS

## 2024-05-29 PROCEDURE — 82962 GLUCOSE BLOOD TEST: CPT

## 2024-05-29 PROCEDURE — 1100000003 HC PRIVATE W/ TELEMETRY

## 2024-05-29 PROCEDURE — 96372 THER/PROPH/DIAG INJ SC/IM: CPT

## 2024-05-29 PROCEDURE — 70498 CT ANGIOGRAPHY NECK: CPT

## 2024-05-29 PROCEDURE — 71045 X-RAY EXAM CHEST 1 VIEW: CPT

## 2024-05-29 PROCEDURE — 80053 COMPREHEN METABOLIC PANEL: CPT

## 2024-05-29 PROCEDURE — 6360000004 HC RX CONTRAST MEDICATION: Performed by: EMERGENCY MEDICINE

## 2024-05-29 PROCEDURE — 82077 ASSAY SPEC XCP UR&BREATH IA: CPT

## 2024-05-29 PROCEDURE — 84484 ASSAY OF TROPONIN QUANT: CPT

## 2024-05-29 PROCEDURE — 99223 1ST HOSP IP/OBS HIGH 75: CPT | Performed by: INTERNAL MEDICINE

## 2024-05-29 PROCEDURE — 2580000003 HC RX 258: Performed by: INTERNAL MEDICINE

## 2024-05-29 PROCEDURE — 2580000003 HC RX 258: Performed by: EMERGENCY MEDICINE

## 2024-05-29 PROCEDURE — 70450 CT HEAD/BRAIN W/O DYE: CPT

## 2024-05-29 PROCEDURE — 6360000002 HC RX W HCPCS: Performed by: INTERNAL MEDICINE

## 2024-05-29 PROCEDURE — 6370000000 HC RX 637 (ALT 250 FOR IP): Performed by: INTERNAL MEDICINE

## 2024-05-29 PROCEDURE — 80307 DRUG TEST PRSMV CHEM ANLYZR: CPT

## 2024-05-29 PROCEDURE — 93005 ELECTROCARDIOGRAM TRACING: CPT | Performed by: EMERGENCY MEDICINE

## 2024-05-29 RX ORDER — ACETAMINOPHEN 650 MG/1
650 SUPPOSITORY RECTAL EVERY 4 HOURS PRN
Status: DISCONTINUED | OUTPATIENT
Start: 2024-05-29 | End: 2024-05-30 | Stop reason: HOSPADM

## 2024-05-29 RX ORDER — PROPRANOLOL HYDROCHLORIDE 40 MG/1
40 TABLET ORAL 3 TIMES DAILY
Status: DISCONTINUED | OUTPATIENT
Start: 2024-05-29 | End: 2024-05-30 | Stop reason: HOSPADM

## 2024-05-29 RX ORDER — ASPIRIN 81 MG/1
81 TABLET, CHEWABLE ORAL DAILY
Status: DISCONTINUED | OUTPATIENT
Start: 2024-05-29 | End: 2024-05-29

## 2024-05-29 RX ORDER — AMLODIPINE BESYLATE 2.5 MG/1
2.5 TABLET ORAL DAILY
Status: DISCONTINUED | OUTPATIENT
Start: 2024-05-29 | End: 2024-05-30 | Stop reason: HOSPADM

## 2024-05-29 RX ORDER — SODIUM CHLORIDE 9 MG/ML
INJECTION, SOLUTION INTRAVENOUS PRN
Status: DISCONTINUED | OUTPATIENT
Start: 2024-05-29 | End: 2024-05-30 | Stop reason: HOSPADM

## 2024-05-29 RX ORDER — SODIUM CHLORIDE 0.9 % (FLUSH) 0.9 %
5-40 SYRINGE (ML) INJECTION EVERY 12 HOURS SCHEDULED
Status: DISCONTINUED | OUTPATIENT
Start: 2024-05-29 | End: 2024-05-30 | Stop reason: HOSPADM

## 2024-05-29 RX ORDER — POLYETHYLENE GLYCOL 3350 17 G/17G
17 POWDER, FOR SOLUTION ORAL DAILY PRN
Status: DISCONTINUED | OUTPATIENT
Start: 2024-05-29 | End: 2024-05-30 | Stop reason: HOSPADM

## 2024-05-29 RX ORDER — SODIUM CHLORIDE 0.9 % (FLUSH) 0.9 %
5-40 SYRINGE (ML) INJECTION PRN
Status: DISCONTINUED | OUTPATIENT
Start: 2024-05-29 | End: 2024-05-30 | Stop reason: HOSPADM

## 2024-05-29 RX ORDER — LABETALOL HYDROCHLORIDE 5 MG/ML
10 INJECTION, SOLUTION INTRAVENOUS EVERY 4 HOURS PRN
Status: DISCONTINUED | OUTPATIENT
Start: 2024-05-29 | End: 2024-05-30 | Stop reason: HOSPADM

## 2024-05-29 RX ORDER — ONDANSETRON 4 MG/1
4 TABLET, ORALLY DISINTEGRATING ORAL EVERY 8 HOURS PRN
Status: DISCONTINUED | OUTPATIENT
Start: 2024-05-29 | End: 2024-05-30 | Stop reason: HOSPADM

## 2024-05-29 RX ORDER — ACETAMINOPHEN 325 MG/1
650 TABLET ORAL EVERY 4 HOURS PRN
Status: DISCONTINUED | OUTPATIENT
Start: 2024-05-29 | End: 2024-05-30 | Stop reason: HOSPADM

## 2024-05-29 RX ORDER — ENOXAPARIN SODIUM 100 MG/ML
40 INJECTION SUBCUTANEOUS DAILY
Status: DISCONTINUED | OUTPATIENT
Start: 2024-05-29 | End: 2024-05-30 | Stop reason: HOSPADM

## 2024-05-29 RX ORDER — 0.9 % SODIUM CHLORIDE 0.9 %
500 INTRAVENOUS SOLUTION INTRAVENOUS ONCE
Status: COMPLETED | OUTPATIENT
Start: 2024-05-29 | End: 2024-05-29

## 2024-05-29 RX ORDER — ASPIRIN 81 MG/1
81 TABLET, CHEWABLE ORAL DAILY
Status: DISCONTINUED | OUTPATIENT
Start: 2024-05-29 | End: 2024-05-30 | Stop reason: HOSPADM

## 2024-05-29 RX ORDER — ROSUVASTATIN CALCIUM 20 MG/1
40 TABLET, COATED ORAL NIGHTLY
Status: DISCONTINUED | OUTPATIENT
Start: 2024-05-29 | End: 2024-05-30 | Stop reason: HOSPADM

## 2024-05-29 RX ORDER — ASPIRIN 300 MG/1
300 SUPPOSITORY RECTAL DAILY
Status: DISCONTINUED | OUTPATIENT
Start: 2024-05-29 | End: 2024-05-29

## 2024-05-29 RX ORDER — ONDANSETRON 2 MG/ML
4 INJECTION INTRAMUSCULAR; INTRAVENOUS EVERY 6 HOURS PRN
Status: DISCONTINUED | OUTPATIENT
Start: 2024-05-29 | End: 2024-05-30 | Stop reason: HOSPADM

## 2024-05-29 RX ADMIN — SODIUM CHLORIDE 500 ML: 9 INJECTION, SOLUTION INTRAVENOUS at 15:00

## 2024-05-29 RX ADMIN — ASPIRIN 81 MG CHEWABLE TABLET 81 MG: 81 TABLET CHEWABLE at 16:56

## 2024-05-29 RX ADMIN — IOPAMIDOL 100 ML: 755 INJECTION, SOLUTION INTRAVENOUS at 14:22

## 2024-05-29 RX ADMIN — ROSUVASTATIN CALCIUM 40 MG: 20 TABLET, COATED ORAL at 22:27

## 2024-05-29 RX ADMIN — SODIUM CHLORIDE, PRESERVATIVE FREE 10 ML: 5 INJECTION INTRAVENOUS at 22:31

## 2024-05-29 RX ADMIN — ENOXAPARIN SODIUM 40 MG: 100 INJECTION SUBCUTANEOUS at 22:27

## 2024-05-29 ASSESSMENT — PAIN SCALES - GENERAL
PAINLEVEL_OUTOF10: 0
PAINLEVEL_OUTOF10: 0

## 2024-05-29 NOTE — ED PROVIDER NOTES
EMERGENCY DEPARTMENT HISTORY AND PHYSICAL EXAM    4:23 PM      Date: 2024  Patient Name: Rachelle Corrales    History of Presenting Illness     No chief complaint on file.      History From: Patient  HPI  Rachelle Corrales is a 49 y.o. who female  has a past medical history of Hypertension, Migraines, and TIA (transient ischemic attack).. Rachelle Corrales is a 49 y.o. female presenting with word finding difficulty and dysarthria that began around 1030 although she woke up not feeling herself today.  No head trauma.  No history of strokes an she d no blood thinners.  She also has been feeling like she might pass out.  She denies a headache.      Nursing Notes were all reviewed and agreed with or any disagreements were addressed in the HPI.    PCP: Shanon Hsu MD    Current Facility-Administered Medications   Medication Dose Route Frequency Provider Last Rate Last Admin    sodium chloride 0.9 % bolus 500 mL  500 mL IntraVENous Once Sindy Blue .9 mL/hr at 24 1500 500 mL at 24 1500    aspirin chewable tablet 81 mg  81 mg Oral Daily Sindy Blue DO         Current Outpatient Medications   Medication Sig Dispense Refill    propranolol (INDERAL) 40 MG tablet Take 1 tablet by mouth 3 times daily 90 tablet 3    amLODIPine (NORVASC) 2.5 MG tablet Take 1 tablet by mouth daily 90 tablet 1    ketoconazole (NIZORAL) 2 % cream Apply topically daily 30 g 2       Past History     Past Medical History:  Past Medical History:   Diagnosis Date    Hypertension     Migraines     TIA (transient ischemic attack)        Past Surgical History:  Past Surgical History:   Procedure Laterality Date     SECTION   &     two times    HYSTERECTOMY (CERVIX STATUS UNKNOWN)         Family History:  Family History   Problem Relation Age of Onset    Diabetes Mother     Gout Mother     Heart Attack Mother     High Blood Pressure Mother     High Cholesterol Mother     Kidney Disease Mother     Heart  443    Radiologic Studies -   XR CHEST PORTABLE   Final Result   1.  No acute cardiopulmonary process.      CTA HEAD NECK W CONTRAST   Final Result      Patent intra- and extracranial cerebral arteries. No large vessel occlusion or   high-grade stenosis.      Preliminary findings of no LVO discussed with Dr. Blue at 2:49 p.m.   5/29/2024.         CT HEAD WO CONTRAST   Final Result      No acute intracranial process.                  Medical Decision Making   I am the first provider for this patient.    I reviewed the vital signs, available nursing notes, past medical history, past surgical history, family history and social history.    Vital Signs-Reviewed the patient's vital signs.            ED Course: Progress Notes, Reevaluation, and Consults:    Provider Notes (Medical Decision Making):     MDM  49-year-old female with word finding difficulty and left facial droop, weakness left upper extremity and stroke alert called    ED Course as of 05/29/24 1623   Wed May 29, 2024   1407 Dr. Kaiser - permissive htn, baby asa if no bleed, stroke workup, admit, also would get mr castorena w/wo to eval for demyelination  [JG]   1606 Call to hospitalist to admit [JG]      ED Course User Index  [JG] Sindy Blue DO       Patient was given the following medications:  Medications   sodium chloride 0.9 % bolus 500 mL (500 mLs IntraVENous New Bag 5/29/24 1500)   aspirin chewable tablet 81 mg (has no administration in time range)   iopamidol (ISOVUE-370) 76 % injection 100 mL (100 mLs IntraVENous Given 5/29/24 1422)       CONSULTS: (Who and What was discussed)  None    Chronic Conditions: htn    Social Determinants affecting Dx or Tx: None    Records Reviewed (source and summary of external notes): Nursing Notes and Old Medical Records    Critical Care    Performed by: Sindy Blue DO  Authorized by: Luz Doan MD    Critical care provider statement:     Critical care time (minutes):  45    Critical care time

## 2024-05-29 NOTE — H&P
Hospitalist Admission History and Physical    NAME:  Rachelle Corrales   :   1975   MRN:   [id number]     PCP:  Shanon Hsu MD  Date/Time:  2024 4:33 PM  Subjective:   CHIEF COMPLAINT: Speech finding to occult    HISTORY OF PRESENT ILLNESS:     Rachelle is a 49 y.o.   female with history of hypertension and migraine came in with some speech finding difficulty on and off since this morning.;  Patient says he was feeling off since yesterday.  Today she woke up with not feeling herself with some speech issues.  She end up coming to the ED where stroke alert was called CT CT of the head and neck was negative neurology recommended MRI of the head with and without contrast to rule out stroke versus demyelinating disease.  Patient has been started on aspirin and statin.    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  Last time disorder symptoms happened she was also setting up somebody else's business/    Patient also noticed her blood pressure was running quite high this morning.  Now back to normal;    Patient symptoms now resolved she has no focal weaknesses speech is back to normal;        Past Medical History:   Diagnosis Date    Hypertension     Migraines     TIA (transient ischemic attack)         Past Surgical History:   Procedure Laterality Date     SECTION   &     two times    HYSTERECTOMY (CERVIX STATUS UNKNOWN)         Social History     Tobacco Use    Smoking status: Every Day     Current packs/day: 0.00     Types: Cigarettes     Last attempt to quit: 2020     Years since quitting: 3.4    Smokeless tobacco: Never   Substance Use Topics    Alcohol use: Not Currently        Family History   Problem Relation Age of Onset    Diabetes Mother     Gout Mother     Heart Attack Mother     High Blood Pressure Mother     High Cholesterol Mother     Kidney Disease Mother     Heart Attack Father     High Blood Pressure

## 2024-05-29 NOTE — ED NOTES
.TRANSFER - OUT REPORT:    Verbal report given to MYCHAL Juan on Rachelle Corrales  being transferred to room 410 for admission.       Report consisted of patient's Situation, Background, Assessment and   Recommendations(SBAR).     Information from the following report(s) Nurse Handoff Report, MAR, and Neuro Assessment was reviewed with the receiving nurse.    [place] Fall Assessment:    Presents to emergency department  because of falls (Syncope, seizure, or loss of consciousness): No  Age > 70: No  Altered Mental Status, Intoxication with alcohol or substance confusion (Disorientation, impaired judgment, poor safety awaremess, or inability to follow instructions): No  Impaired Mobility: Ambulates or transfers with assistive devices or assistance; Unable to ambulate or transer.: Yes (needs assistance to ambulate)  Nursing Judgement: Yes (Possible stroke and pt needs help to ambulate.)          Lines:   Peripheral IV 05/29/24 Proximal;Right Forearm (Active)        Opportunity for questions and clarification was provided.

## 2024-05-30 ENCOUNTER — APPOINTMENT (OUTPATIENT)
Facility: HOSPITAL | Age: 49
DRG: 199 | End: 2024-05-30
Attending: INTERNAL MEDICINE
Payer: COMMERCIAL

## 2024-05-30 ENCOUNTER — APPOINTMENT (OUTPATIENT)
Facility: HOSPITAL | Age: 49
DRG: 199 | End: 2024-05-30
Payer: COMMERCIAL

## 2024-05-30 VITALS
OXYGEN SATURATION: 97 % | BODY MASS INDEX: 32.58 KG/M2 | RESPIRATION RATE: 18 BRPM | SYSTOLIC BLOOD PRESSURE: 142 MMHG | WEIGHT: 220 LBS | DIASTOLIC BLOOD PRESSURE: 84 MMHG | HEART RATE: 116 BPM | HEIGHT: 69 IN | TEMPERATURE: 98.1 F

## 2024-05-30 LAB
CHOLEST SERPL-MCNC: 178 MG/DL
ECHO AO ASC DIAM: 3.2 CM
ECHO AO ASCENDING AORTA INDEX: 1.49 CM/M2
ECHO AO ROOT DIAM: 3.5 CM
ECHO AO ROOT INDEX: 1.63 CM/M2
ECHO AV AREA PEAK VELOCITY: 2.9 CM2
ECHO AV AREA VTI: 3.2 CM2
ECHO AV AREA/BSA PEAK VELOCITY: 1.3 CM2/M2
ECHO AV AREA/BSA VTI: 1.5 CM2/M2
ECHO AV MEAN GRADIENT: 3 MMHG
ECHO AV MEAN VELOCITY: 0.9 M/S
ECHO AV PEAK GRADIENT: 7 MMHG
ECHO AV PEAK VELOCITY: 1.3 M/S
ECHO AV VELOCITY RATIO: 0.85
ECHO AV VTI: 26.4 CM
ECHO BSA: 2.2 M2
ECHO EST RA PRESSURE: 3 MMHG
ECHO LA DIAMETER INDEX: 1.95 CM/M2
ECHO LA DIAMETER: 4.2 CM
ECHO LA TO AORTIC ROOT RATIO: 1.2
ECHO LA VOL A-L A2C: 64 ML (ref 22–52)
ECHO LA VOL A-L A4C: 92 ML (ref 22–52)
ECHO LA VOL BP: 75 ML (ref 22–52)
ECHO LA VOL MOD A2C: 63 ML (ref 22–52)
ECHO LA VOL MOD A4C: 88 ML (ref 22–52)
ECHO LA VOL/BSA BIPLANE: 35 ML/M2 (ref 16–34)
ECHO LA VOLUME AREA LENGTH: 77 ML
ECHO LA VOLUME INDEX A-L A2C: 30 ML/M2 (ref 16–34)
ECHO LA VOLUME INDEX A-L A4C: 43 ML/M2 (ref 16–34)
ECHO LA VOLUME INDEX AREA LENGTH: 36 ML/M2 (ref 16–34)
ECHO LA VOLUME INDEX MOD A2C: 29 ML/M2 (ref 16–34)
ECHO LA VOLUME INDEX MOD A4C: 41 ML/M2 (ref 16–34)
ECHO LV E' LATERAL VELOCITY: 9 CM/S
ECHO LV E' SEPTAL VELOCITY: 6 CM/S
ECHO LV EDV A2C: 150 ML
ECHO LV EDV A4C: 125 ML
ECHO LV EDV BP: 138 ML (ref 56–104)
ECHO LV EDV INDEX A4C: 58 ML/M2
ECHO LV EDV INDEX BP: 64 ML/M2
ECHO LV EDV NDEX A2C: 70 ML/M2
ECHO LV EJECTION FRACTION A2C: 63 %
ECHO LV EJECTION FRACTION A4C: 58 %
ECHO LV EJECTION FRACTION BIPLANE: 59 % (ref 55–100)
ECHO LV ESV A2C: 56 ML
ECHO LV ESV A4C: 52 ML
ECHO LV ESV BP: 56 ML (ref 19–49)
ECHO LV ESV INDEX A2C: 26 ML/M2
ECHO LV ESV INDEX A4C: 24 ML/M2
ECHO LV ESV INDEX BP: 26 ML/M2
ECHO LV FRACTIONAL SHORTENING: 30 % (ref 28–44)
ECHO LV INTERNAL DIMENSION DIASTOLE INDEX: 2.14 CM/M2
ECHO LV INTERNAL DIMENSION DIASTOLIC: 4.6 CM (ref 3.9–5.3)
ECHO LV INTERNAL DIMENSION SYSTOLIC INDEX: 1.49 CM/M2
ECHO LV INTERNAL DIMENSION SYSTOLIC: 3.2 CM
ECHO LV IVSD: 1.2 CM (ref 0.6–0.9)
ECHO LV MASS 2D: 192.9 G (ref 67–162)
ECHO LV MASS INDEX 2D: 89.7 G/M2 (ref 43–95)
ECHO LV POSTERIOR WALL DIASTOLIC: 1.1 CM (ref 0.6–0.9)
ECHO LV RELATIVE WALL THICKNESS RATIO: 0.48
ECHO LVOT AREA: 3.5 CM2
ECHO LVOT AV VTI INDEX: 0.91
ECHO LVOT DIAM: 2.1 CM
ECHO LVOT MEAN GRADIENT: 3 MMHG
ECHO LVOT PEAK GRADIENT: 4 MMHG
ECHO LVOT PEAK VELOCITY: 1.1 M/S
ECHO LVOT STROKE VOLUME INDEX: 38.5 ML/M2
ECHO LVOT SV: 82.7 ML
ECHO LVOT VTI: 23.9 CM
ECHO MV A VELOCITY: 0.72 M/S
ECHO MV AREA PHT: 3.5 CM2
ECHO MV AREA VTI: 3.6 CM2
ECHO MV E DECELERATION TIME (DT): 182.7 MS
ECHO MV E VELOCITY: 0.9 M/S
ECHO MV E/A RATIO: 1.25
ECHO MV E/E' LATERAL: 10
ECHO MV E/E' RATIO (AVERAGED): 12.5
ECHO MV E/E' SEPTAL: 15
ECHO MV LVOT VTI INDEX: 0.97
ECHO MV MAX VELOCITY: 0.9 M/S
ECHO MV MEAN GRADIENT: 1 MMHG
ECHO MV MEAN VELOCITY: 0.5 M/S
ECHO MV PEAK GRADIENT: 3 MMHG
ECHO MV PRESSURE HALF TIME (PHT): 63.7 MS
ECHO MV VTI: 23.3 CM
ECHO PULMONARY ARTERY END DIASTOLIC PRESSURE: 5 MMHG
ECHO PV MAX VELOCITY: 0.7 M/S
ECHO PV PEAK GRADIENT: 2 MMHG
ECHO PV REGURGITANT MAX VELOCITY: 1.1 M/S
ECHO RV FREE WALL PEAK S': 13 CM/S
ECHO RV TAPSE: 2.2 CM (ref 1.7–?)
ERYTHROCYTE [DISTWIDTH] IN BLOOD BY AUTOMATED COUNT: 13.3 % (ref 11.6–14.5)
EST. AVERAGE GLUCOSE BLD GHB EST-MCNC: 108 MG/DL
HBA1C MFR BLD: 5.4 % (ref 4.2–5.6)
HCT VFR BLD AUTO: 41 % (ref 35–45)
HDLC SERPL-MCNC: 38 MG/DL (ref 40–60)
HDLC SERPL: 4.7 (ref 0–5)
HGB BLD-MCNC: 13.3 G/DL (ref 12–16)
LDLC SERPL CALC-MCNC: 120.2 MG/DL (ref 0–100)
LIPID PANEL: ABNORMAL
MCH RBC QN AUTO: 28.1 PG (ref 24–34)
MCHC RBC AUTO-ENTMCNC: 32.4 G/DL (ref 31–37)
MCV RBC AUTO: 86.5 FL (ref 78–100)
NRBC # BLD: 0 K/UL (ref 0–0.01)
NRBC BLD-RTO: 0 PER 100 WBC
PLATELET # BLD AUTO: 174 K/UL (ref 135–420)
PMV BLD AUTO: 10.5 FL (ref 9.2–11.8)
RBC # BLD AUTO: 4.74 M/UL (ref 4.2–5.3)
TRIGL SERPL-MCNC: 99 MG/DL
VLDLC SERPL CALC-MCNC: 19.8 MG/DL
WBC # BLD AUTO: 10 K/UL (ref 4.6–13.2)

## 2024-05-30 PROCEDURE — 93306 TTE W/DOPPLER COMPLETE: CPT

## 2024-05-30 PROCEDURE — 36415 COLL VENOUS BLD VENIPUNCTURE: CPT

## 2024-05-30 PROCEDURE — 99222 1ST HOSP IP/OBS MODERATE 55: CPT | Performed by: PSYCHIATRY & NEUROLOGY

## 2024-05-30 PROCEDURE — 83036 HEMOGLOBIN GLYCOSYLATED A1C: CPT

## 2024-05-30 PROCEDURE — 94761 N-INVAS EAR/PLS OXIMETRY MLT: CPT

## 2024-05-30 PROCEDURE — 6370000000 HC RX 637 (ALT 250 FOR IP): Performed by: INTERNAL MEDICINE

## 2024-05-30 PROCEDURE — 92610 EVALUATE SWALLOWING FUNCTION: CPT

## 2024-05-30 PROCEDURE — G0378 HOSPITAL OBSERVATION PER HR: HCPCS

## 2024-05-30 PROCEDURE — 80061 LIPID PANEL: CPT

## 2024-05-30 PROCEDURE — 92526 ORAL FUNCTION THERAPY: CPT

## 2024-05-30 PROCEDURE — 97165 OT EVAL LOW COMPLEX 30 MIN: CPT

## 2024-05-30 PROCEDURE — 85027 COMPLETE CBC AUTOMATED: CPT

## 2024-05-30 PROCEDURE — 70551 MRI BRAIN STEM W/O DYE: CPT

## 2024-05-30 PROCEDURE — 2580000003 HC RX 258: Performed by: INTERNAL MEDICINE

## 2024-05-30 RX ORDER — ROSUVASTATIN CALCIUM 40 MG/1
40 TABLET, COATED ORAL NIGHTLY
Qty: 30 TABLET | Refills: 3 | Status: SHIPPED | OUTPATIENT
Start: 2024-05-30

## 2024-05-30 RX ORDER — ASPIRIN 81 MG/1
81 TABLET, CHEWABLE ORAL DAILY
Qty: 30 TABLET | Refills: 3 | Status: SHIPPED | OUTPATIENT
Start: 2024-05-31

## 2024-05-30 RX ADMIN — SODIUM CHLORIDE, PRESERVATIVE FREE 10 ML: 5 INJECTION INTRAVENOUS at 10:30

## 2024-05-30 RX ADMIN — ASPIRIN 81 MG CHEWABLE TABLET 81 MG: 81 TABLET CHEWABLE at 10:29

## 2024-05-30 ASSESSMENT — PAIN SCALES - GENERAL
PAINLEVEL_OUTOF10: 0

## 2024-05-30 NOTE — PLAN OF CARE
on aspiration precautions and importance of compensatory swallow techniques to decrease aspiration risk (decrease rate of intake & sip/bite size, upright @HOB for all po intake and ~30 minutes after po); verbalized comprehension.        PLAN:  Recommendations and Planned Interventions:  No formal ST needs identified for dysphagia. Eval only.   Discharge Recommendations: D/C Recommendations: No follow up therapy recommended post discharge     SUBJECTIVE:   Patient stated “I'm ready to go home”.    OBJECTIVE:     Past Medical History:   Diagnosis Date    Hypertension     Migraines     TIA (transient ischemic attack)      Past Surgical History:   Procedure Laterality Date     SECTION   &     two times    HYSTERECTOMY (CERVIX STATUS UNKNOWN)       Daily Assessment:  Baseline Assessment  Respiratory Status: Room air  O2 Device: None (Room air)  Communication Observation: Functional  Follows Directions: Complex  Current Diet : Regular  Current Liquid Diet : Thin  Dentition: Adequate  Patient Positioning: Upright in bed  Baseline Vocal Quality: Normal  Volitional Cough: Strong    Orientation:  Person, Place, Time, and Situation    Oral Assessment:  Oral Motor   Labial: No impairment  Dentition: Intact, Full  Oral Hygiene: Moist, Clean  Lingual: No impairment  Mandible: No impairment        P.O. Trials:  PO Trials  Assessment Method(s): Observation, Palpation  Patient Position: upright in bed  Vocal Quality: No Impairment  Consistency Presented: Regular, Thin  How Presented: Self-fed/presented, Straw, Cup/sip  Bolus Acceptance: No impairment  Bolus Formation/Control: No impairment  Propulsion: No impairment  Oral Residue: None  Initiation of Swallow: No impairment  Laryngeal Elevation: Functional  Aspiration Signs/Symptoms: None  Pharyngeal Phase Characteristics: No impairment, issues, or problems          PAIN:  Start of Eval: 0  End of Eval: 0     After evaluation:   []            Patient left in no  apparent distress sitting up in chair  [x]            Patient left in no apparent distress in bed  [x]            Call bell left within reach  [x]            Nursing notified  []            Family present  []            Caregiver present  []            Bed alarm activated      COMMUNICATION/EDUCATION:   [x]            Aspiration precautions; swallow safety; compensatory techniques provided via demonstration, verbalization and teach back of comprehension  []         Patient/family have participated as able in goal setting and plan of care.  [x]            Patient/family agree to work toward stated goals and plan of care.  []            Patient understands intent and goals of therapy, neutral about participation.  []            Patient unable to participate in goal setting/plan of care secondary to cognition, hearing/vision deficits; education ongoing with interdisciplinary staff   []            Handout regarding diet recommendations and thickener instructions provided.  [x]         Posted safety precautions in patient's room.    Thank you for this referral.    Tasha Ortiz M.S. CCC-SLP  Speech Language Pathologist

## 2024-05-30 NOTE — PROGRESS NOTES
OCCUPATIONAL THERAPY EVALUATION/DISCHARGE    Patient: Rachelle Corrales (49 y.o. female)  Date: 2024  Primary Diagnosis: TIA (transient ischemic attack) [G45.9]  Precautions: General Precautions  PLOF: Patient was independent with self-care and functional mobility PTA. Pt active, working in healthcare.      ASSESSMENT AND RECOMMENDATIONS:  Based on the objective data described below, pt presents with no deficits that impede pt function with ADLs, functional transfers, and functional mobility in preparation for selfcare tasks. Pt left all needs met and call bell in reach.      Maximum therapeutic gains met at current level of care and patient will be discharged from occupational therapy at this time.    Further Equipment Recommendations for Discharge: N/A    AMPA: AM-PAC Inpatient Daily Activity Raw Score: 24    At this time and based on an AM-PAC score, no further OT is recommended upon discharge due to patient at baseline functional status for ADLs. Recommend patient returns to prior setting with prior services.    This [facility] score should be considered in conjunction with interdisciplinary team recommendations to determine the most appropriate discharge setting. Patient's social support, diagnosis, medical stability, and prior level of function should also be taken into consideration.     SUBJECTIVE:   Patient stated “It happened twice and then I called my doctor”    OBJECTIVE DATA SUMMARY:     Past Medical History:   Diagnosis Date    Hypertension     Migraines     TIA (transient ischemic attack)      Past Surgical History:   Procedure Laterality Date     SECTION   &     two times    HYSTERECTOMY (CERVIX STATUS UNKNOWN)         Home Situation:   Social/Functional History  Lives With: Significant other  Type of Home: House  Home Layout: Two level  Home Access: Stairs to enter with rails  Bathroom Shower/Tub: Tub/Shower unit  ADL Assistance: Independent  [x]  Right hand dominant   []  Left

## 2024-05-30 NOTE — CONSULTS
Neurology Consult Note        Reason for Consult:  Transient left facial weakness and dysarthria  Requesting Physician:  Dr Travis    CHIEF COMPLAINT:  left sided weakness and slurred speech    History Obtained From:  patient, electronic medical record       HISTORY OF PRESENT ILLNESS:    This is a very pleasant 48 y/o AAF with a known history of HTN and migraines who presented for the second time with left sided facial weakness and slurred speech.   She has a history of migraines for which she takes propranolol TID but denies prior complex symptoms. She did have some posterior headache with her first episode but otherwise it was a very similar event. She presently states she is back to normal and is ready to go home. Her pressure was initially as high as 170s/101 at the time of her symptoms. Her CTA and TTE and MRI are negative.     Subjective:     Past Medical History:        Diagnosis Date    Hypertension     Migraines     TIA (transient ischemic attack)      Past Surgical History:        Procedure Laterality Date     SECTION   &     two times    HYSTERECTOMY (CERVIX STATUS UNKNOWN)       Current Medications:   Current Facility-Administered Medications: perflutren lipid microspheres (DEFINITY) injection 2 mL, 2 mL, IntraVENous, ONCE PRN  aspirin chewable tablet 81 mg, 81 mg, Oral, Daily  [Held by provider] amLODIPine (NORVASC) tablet 2.5 mg, 2.5 mg, Oral, Daily  [Held by provider] propranolol (INDERAL) tablet 40 mg, 40 mg, Oral, TID  sodium chloride flush 0.9 % injection 5-40 mL, 5-40 mL, IntraVENous, 2 times per day  sodium chloride flush 0.9 % injection 5-40 mL, 5-40 mL, IntraVENous, PRN  0.9 % sodium chloride infusion, , IntraVENous, PRN  ondansetron (ZOFRAN-ODT) disintegrating tablet 4 mg, 4 mg, Oral, Q8H PRN **OR** ondansetron (ZOFRAN) injection 4 mg, 4 mg, IntraVENous, Q6H PRN  polyethylene glycol (GLYCOLAX) packet 17 g, 17 g, Oral, Daily PRN  enoxaparin (LOVENOX) injection 40 mg, 40 mg,  symmetric to movement and sensation  Hearing is intact to conversation  Tongue midline, no dysarthria or dysphonia    Motor exam:  Strength   (Right/Left)  Deltoids           5/5  Biceps             5/5  Triceps            5/5  Wrist ext          5/5  Finger ext        5/5  Hand intrin       5/5    Hip flex            5/5  Knee ext          5/5  Ankle dorsi       5/5      DTRs           (right/left)  1 throughout    Coordination  Finger nose finger intact bilaterally    Sensation: intact to LT     No pronator drift    Gait: normal native        DATA  CTA, head CT and MRI reviewed    IMPRESSION:   This is a 48 y/o AAF with a known history of migraines and HTN who presented with her second episode of left facial droop and dysarthria. Her work-up is reassuringly negative for stroke, prior strokes or vascular process. I suspect this event is either due to hypertension or even complex migraine. She has returned to normal.    I spoke with her at length regarding these diagnostic possibilities.               RECOMMENDATIONS:   OK for d/c to home without limitations.               Rogers Pastor M.D.  Clinical Neurophysiologist  Neuromuscular Medicine

## 2024-05-30 NOTE — CONSULTS
Consult received. They recommended MRI +/- JARRELL. I have changed this to a simple non-contrast MRI. Will consider JARRELL later if needed but this history is not consistent with demyelinating disease.      Rogers Pastor M.D.  Clinical Neurophysiologist  Neuromuscular Medicine

## 2024-05-30 NOTE — DISCHARGE SUMMARY
[facility]    Discharge Summary    Patient: Rachelle Corrales MRN: [id number]  CSN: [id number]    YOB: 1975  Age: 49 y.o.  Sex: female    DOA: 5/29/2024 LOS:  LOS: 1 day   Discharge Date:          Discharge Diagnoses:   1 Slurred speech - r/o CVA   2 HTN   3 Hyperlipidemia  4 Migraines-          Discharge Condition: Good    Discharge To: Home    Consults: Neurology    HPI: per Admitting MD \" HISTORY OF PRESENT ILLNESS:     Rachelle is a 49 y.o.   female with history of hypertension and migraine came in with some speech finding difficulty on and off since this morning.;  Patient says he was feeling off since yesterday.  Today she woke up with not feeling herself with some speech issues.  She end up coming to the ED where stroke alert was called CT CT of the head and neck was negative neurology recommended MRI of the head with and without contrast to rule out stroke versus demyelinating disease.  Patient has been started on aspirin and statin.     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  Last time disorder symptoms happened she was also setting up somebody else's business/     Patient also noticed her blood pressure was running quite high this morning.  Now back to normal;     Patient symptoms now resolved she has no focal weaknesses speech is back to normal;       Hospital Course: 49-year-old female past history of hypertension, hyperlipidemia admitted with finding herself dysarthric or speech disturbances at work, according to her place where she works they could check her blood pressure and blood pressure kept on going up and she was\" not looking okay\" per colleagues hence they recommended her to go to the emergency room.  Patient came to [facility] here blood pressure was not very high slightly higher than goal, patient has hyperlipidemia on the labs.  Admitted to rule out CVA.  MRI  cm/m2    LV RWT Ratio 0.48     LV Mass 2D 192.9 (A) 67 - 162 g    LV Mass 2D Index 89.7 43 - 95 g/m2    MV E/A 1.25     E/E' Ratio (Averaged) 12.50     E/E' Lateral 10.00     E/E' Septal 15.00     LA Volume Index BP 35 (A) 16 - 34 ml/m2    LA Volume Index A/L 36 16 - 34 mL/m2    LVOT Stroke Volume Index 38.5 mL/m2    LVOT Area 3.5 cm2    LA Volume Index A-L A2C 30 16 - 34 mL/m2    LA Volume Index A-L A4C 43 (A) 16 - 34 mL/m2    LA Volume Index MOD A2C 29 16 - 34 ml/m2    LA Volume Index MOD A4C 41 (A) 16 - 34 ml/m2    LA Size Index 1.95 cm/m2    LA/AO Root Ratio 1.20     Ao Root Index 1.63 cm/m2    Ascending Aorta Index 1.49 cm/m2    AV Velocity Ratio 0.85     LVOT:AV VTI Index 0.91     LISBET/BSA VTI 1.5 cm2/m2    LISBET/BSA Peak Velocity 1.3 cm2/m2    MV:LVOT VTI Index 0.97        XR CHEST PORTABLE    Result Date: 5/29/2024  XR CHEST PORTABLE Indication: cva Comparison: 11/1/2023 Findings: Heart size is normal. The lungs are clear. No pleural effusion or pneumothorax. The visualized osseous structures are unremarkable.     1.  No acute cardiopulmonary process.    CTA HEAD NECK W CONTRAST    Result Date: 5/29/2024  CTA NECK/BRAIN CLINICAL INDICATIONS: Code S for facial droop left side, left-sided weakness and aphasia. TECHNIQUE: Helical CT scan of the brain and neck were performed at 1.25 mm intervals during rapid IV bolus contrast administration.  These data were reconstructed at .625 mm intervals for vascular analysis.  The data was also reviewed at 2.5 mm intervals for accompanying soft tissue analysis. 3D post processed images, including surface shaded displays, were produced for this exam on independent console, permanently archived and interpreted. All CT scans at this facility are performed using dose optimization technique as appropriate to a performed exam, to include automated exposure control, adjustment of the MA and/or kV according to patient size (including appropriate matching for site-specific

## 2024-05-30 NOTE — CARE COORDINATION
Discharge order noted.   Patient is ambulatory, no DME used in the home.   No CM needs.   Patient's family to transport patient home today for discharge.           Mindy Corrigan RN  Case Management [phone]

## 2024-05-30 NOTE — PROGRESS NOTES
Advance Care Planning   Healthcare Decision Maker:      Today we documented Decision Maker(s) consistent with Legal Next of Kin hierarchy.       Sindy Griffin Child   Yes       Home Phone: [phone] Phone: [phone]         conducted an initial consultation and Spiritual Assessment for Rachelle Corrales, who is a 49 y.o.,female. Patient's Primary Language is: English.   According to the patient's EMR Episcopalian Affiliation is: None.     The reason the Patient came to the hospital is:   Patient Active Problem List    Diagnosis Date Noted    Primary hypertension 05/29/2024    TIA (transient ischemic attack) 01/17/2019    Migraine 01/17/2019        The  provided the following Interventions:  Initiated a relationship of care and support.   Provided information about Spiritual Care Services.  Chart reviewed.    The following outcomes where achieved:  Patient expressed gratitude for 's visit.    Assessment:  Patient does not have any Moravian/cultural needs that will affect patient's preferences in health care.  There are no spiritual or Moravian issues which require intervention at this time.     Plan:  Chaplains will continue to follow and will provide pastoral care on an as needed/requested basis.   recommends bedside caregivers page  on duty if patient shows signs of acute spiritual or emotional distress.    Chaplain Suni Ochoa  Spiritual Care   [phone]

## 2024-05-30 NOTE — PROGRESS NOTES
Physical Therapy  PT order received and chart reviewed. Upon arrival pt up walking in room and hallway adlib with no AD. Current independent mobility level. Reports of symptoms have resolved and no concerns with mobility at d/c. Thank you for this referral.   Alyssa Ngo, SPT

## 2024-05-30 NOTE — DISCHARGE INSTRUCTIONS
Patient armband removed and shredded.  Thank you for enrolling in [email or web address]. Please follow the instructions below to securely access your online medical record. [email or web address] allows you to send messages to your doctor, view your test results, renew your prescriptions, schedule appointments, and more.     How Do I Sign Up?  In your Internet browser, go to [email or web address]  Click on the Sign Up Now link in the Sign In box. You will see the New Member Sign Up page.  Enter your [email or web address] Access Code exactly as it appears below. You will not need to use this code after you’ve completed the sign-up process. If you do not sign up before the expiration date, you must request a new code.  [email or web address] Access Code: Activation code not generated  Current [email or web address] Status: Active    Enter your Social Security Number ([id number]) and Date of Birth (mm/dd/yyyy) as indicated and click Submit. You will be taken to the next sign-up page.  Create a [email or web address] ID. This will be your [email or web address] login ID and cannot be changed, so think of one that is secure and easy to remember.  Create a [email or web address] password. You can change your password at any time.  Enter your Password Reset Question and Answer. This can be used at a later time if you forget your password.   Enter your e-mail address. You will receive e-mail notification when new information is available in [email or web address].  Click Sign Up. You can now view your medical record.     Additional Information  If you have questions, please contact your physician practice where you receive care. Remember, [email or web address] is NOT to be used for urgent needs. For medical emergencies, dial 911.  Discharge medications reviewed with the patient  DISCHARGE SUMMARY from Nurse    PATIENT INSTRUCTIONS:    After general anesthesia or intravenous sedation, for 24 hours or while taking prescription Narcotics:  Limit your activities  Do not drive and operate hazardous machinery  Do not make important personal or

## 2024-05-31 ENCOUNTER — CLINICAL DOCUMENTATION (OUTPATIENT)
Age: 49
End: 2024-05-31

## 2024-05-31 NOTE — PROGRESS NOTES
PATIENT REQUIRES TCM OUTREACH    MRN: [id number]     PATIENT:  Rachelle Corrales    ADMIT DATE:  5/29/24    DISCHARGE DATE:  5/30/24     ADMITTING DX: TIA    MEDICATION CHANGES:   Start: aspirin, Crestor  Stop:   Change:    SPECIALISTS:  Neuro    HOME HEALTH/WOUND CARE/PT/OT:  none    SCHEDULED FOLLOW UP APPTS:    Future Appointments   Date Time Provider Department Center   9/20/2024 10:00 AM Shanon Hsu MD [facility]       *Please contact patient within 2 business days and document under .TCMOFFICE.  A scheduled Hospital Follow-up for patient within 7 days is preferred*

## 2024-06-02 LAB
EKG ATRIAL RATE: 77 BPM
EKG DIAGNOSIS: NORMAL
EKG P AXIS: 44 DEGREES
EKG P-R INTERVAL: 196 MS
EKG Q-T INTERVAL: 392 MS
EKG QRS DURATION: 100 MS
EKG QTC CALCULATION (BAZETT): 443 MS
EKG R AXIS: 5 DEGREES
EKG T AXIS: 37 DEGREES
EKG VENTRICULAR RATE: 77 BPM

## 2024-06-07 ENCOUNTER — OFFICE VISIT (OUTPATIENT)
Facility: CLINIC | Age: 49
End: 2024-06-07
Payer: COMMERCIAL

## 2024-06-07 VITALS
HEIGHT: 69 IN | SYSTOLIC BLOOD PRESSURE: 137 MMHG | WEIGHT: 288 LBS | OXYGEN SATURATION: 97 % | TEMPERATURE: 97.3 F | DIASTOLIC BLOOD PRESSURE: 85 MMHG | BODY MASS INDEX: 42.65 KG/M2 | HEART RATE: 85 BPM | RESPIRATION RATE: 12 BRPM

## 2024-06-07 DIAGNOSIS — E66.01 CLASS 3 SEVERE OBESITY DUE TO EXCESS CALORIES WITH SERIOUS COMORBIDITY AND BODY MASS INDEX (BMI) OF 40.0 TO 44.9 IN ADULT (HCC): ICD-10-CM

## 2024-06-07 DIAGNOSIS — Z09 HOSPITAL DISCHARGE FOLLOW-UP: ICD-10-CM

## 2024-06-07 DIAGNOSIS — G45.9 TIA (TRANSIENT ISCHEMIC ATTACK): ICD-10-CM

## 2024-06-07 DIAGNOSIS — I10 ESSENTIAL (PRIMARY) HYPERTENSION: Primary | ICD-10-CM

## 2024-06-07 PROCEDURE — 3079F DIAST BP 80-89 MM HG: CPT | Performed by: STUDENT IN AN ORGANIZED HEALTH CARE EDUCATION/TRAINING PROGRAM

## 2024-06-07 PROCEDURE — 3075F SYST BP GE 130 - 139MM HG: CPT | Performed by: STUDENT IN AN ORGANIZED HEALTH CARE EDUCATION/TRAINING PROGRAM

## 2024-06-07 PROCEDURE — 99214 OFFICE O/P EST MOD 30 MIN: CPT | Performed by: STUDENT IN AN ORGANIZED HEALTH CARE EDUCATION/TRAINING PROGRAM

## 2024-06-07 PROCEDURE — 1111F DSCHRG MED/CURRENT MED MERGE: CPT | Performed by: STUDENT IN AN ORGANIZED HEALTH CARE EDUCATION/TRAINING PROGRAM

## 2024-06-07 RX ORDER — TIRZEPATIDE 2.5 MG/.5ML
2.5 INJECTION, SOLUTION SUBCUTANEOUS
Qty: 6 ML | Refills: 1 | Status: SHIPPED | OUTPATIENT
Start: 2024-06-07

## 2024-06-07 RX ORDER — AMLODIPINE BESYLATE 5 MG/1
5 TABLET ORAL DAILY
Qty: 90 TABLET | Refills: 1 | Status: SHIPPED | OUTPATIENT
Start: 2024-07-05

## 2024-06-07 ASSESSMENT — ENCOUNTER SYMPTOMS
CONSTIPATION: 0
VOMITING: 0
FACIAL SWELLING: 0
DIARRHEA: 0
EYE ITCHING: 0
COLOR CHANGE: 0
EYE PAIN: 0
EYE DISCHARGE: 0
BACK PAIN: 0
SHORTNESS OF BREATH: 0
EYE REDNESS: 0
ABDOMINAL PAIN: 0

## 2024-06-07 NOTE — PROGRESS NOTES
Rachelle Corrales is a 49 y.o.  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 [place] like she was going to past out. Her BP was 163/100. She sat down, tried drinking water. She was trying to work, but she was hearing herself slur her words.  She could not understand why she was feeling that way. Then the 2nd time was 171/110.  She started to feel like the L side of her face was drooping, and she drove herself to the ER.  She was called code stroke. When she was getting asked questions she was not able to articulate. Was discharge on Crestor and ASA.    After the fact she felt like it affected her significantly.     Hypertension follow up:  Taking medications as prescribed:YES  Checking BP at home: YES - have been elevated.  Symptoms: none  Low sodium diet: Yes  Exercise: No  Took yesterday decongestant medication d/t feeling like she has a sinus infection    Patient Active Problem List   Diagnosis    TIA (transient ischemic attack)    Migraine    Primary hypertension      Past Medical History:   Diagnosis Date    Hypertension     Migraines     TIA (transient ischemic attack)       Past Surgical History:   Procedure Laterality Date     SECTION   &     two times    HYSTERECTOMY (CERVIX STATUS UNKNOWN)        Family History   Problem Relation Age of Onset    Diabetes Mother     Gout Mother     Heart Attack Mother     High Blood Pressure Mother     High Cholesterol Mother     Kidney Disease Mother     Heart Attack Father     High Blood Pressure Father     Colon Cancer Father     Breast Cancer Maternal Aunt             Vision Loss Maternal Grandmother     Breast Cancer Paternal Grandmother             Hypertension Other         mother side of family    Diabetes Other         mother side of family    Cancer Other         mother side of

## 2024-06-07 NOTE — PROGRESS NOTES
Rachelle Corrales is a 49 y.o. year old female who presents today for   Chief Complaint   Patient presents with    Follow-up       Is someone accompanying this pt? No     Is the patient using any DME equipment during OV? No     Depression Screenin/2/2024    11:34 AM 2023     1:08 PM 2023     2:18 PM 3/20/2023     2:20 PM 2022     1:53 PM   PHQ-9 Questionaire   Little interest or pleasure in doing things 0 0 0 0 0   Feeling down, depressed, or hopeless 0 0 1 1 0   PHQ-9 Total Score 0 0 1 1 0       Abuse Screenin/2/2024    11:00 AM   AMB Abuse Screening   Do you ever feel afraid of your partner? N   Are you in a relationship with someone who physically or mentally threatens you? N   Is it safe for you to go home? Y       Learning Assessment:  No question data found.    Fall Risk:       No data to display                    Coordination of Care:   1. \"Have you been to the ER, urgent care clinic since your last visit?  Hospitalized since your last visit?\" Yes     2. \"Have you seen or consulted any other health care providers outside of the [facility] since your last visit?\" Yes     3. For patients aged 45-75: Has the patient had a colonoscopy / FIT/ Cologuard? Not due     If the patient is female:    4. For patients aged 40-74: Has the patient had a mammogram within the past 2 years? Not due     5. For patients aged 21-65: Has the patient had a pap smear? Not due     Health Maintenance: reviewed and discussed and ordered per Provider.    Health Maintenance Due   Topic Date Due    Hepatitis B vaccine (1 of 3 - 3-dose series) Never done    Pneumococcal 0-64 years Vaccine (1 of 2 - PCV) Never done    DTaP/Tdap/Td vaccine (1 - Tdap) Never done    COVID-19 Vaccine (3 - - season) 2023        -Elisabeth Gilmore LPN  [facility]  Phone: [phone]  Fax: [phone]

## 2024-07-10 DIAGNOSIS — I10 ESSENTIAL (PRIMARY) HYPERTENSION: Primary | ICD-10-CM

## 2024-07-10 RX ORDER — VALSARTAN 80 MG/1
80 TABLET ORAL DAILY
Qty: 30 TABLET | Refills: 0 | Status: SHIPPED | OUTPATIENT
Start: 2024-07-10

## 2024-07-15 ENCOUNTER — TELEPHONE (OUTPATIENT)
Facility: CLINIC | Age: 49
End: 2024-07-15

## 2024-07-15 NOTE — TELEPHONE ENCOUNTER
Called patient to update patient about PA for Zepbound. No determination has been made as of 07/15/2024. No answer, unable to leave VM.

## 2024-07-22 ENCOUNTER — TELEPHONE (OUTPATIENT)
Facility: CLINIC | Age: 49
End: 2024-07-22

## 2024-07-22 NOTE — TELEPHONE ENCOUNTER
Fax received from [employer] stating the Zepbound has been denied due to the associated diagnosis. Attempted to call patient, no answer. Unable to leave VM.

## 2024-07-24 ENCOUNTER — OFFICE VISIT (OUTPATIENT)
Facility: CLINIC | Age: 49
End: 2024-07-24

## 2024-07-24 VITALS — SYSTOLIC BLOOD PRESSURE: 138 MMHG | DIASTOLIC BLOOD PRESSURE: 80 MMHG

## 2024-07-24 DIAGNOSIS — I10 ESSENTIAL (PRIMARY) HYPERTENSION: ICD-10-CM

## 2024-07-24 DIAGNOSIS — E66.01 CLASS 3 SEVERE OBESITY DUE TO EXCESS CALORIES WITH SERIOUS COMORBIDITY AND BODY MASS INDEX (BMI) OF 40.0 TO 44.9 IN ADULT (HCC): Primary | ICD-10-CM

## 2024-07-24 RX ORDER — VALSARTAN 160 MG/1
160 TABLET ORAL DAILY
Qty: 30 TABLET | Refills: 5 | Status: SHIPPED | OUTPATIENT
Start: 2024-07-24

## 2024-07-24 NOTE — PROGRESS NOTES
Pt came with her BP machine which is reading 10 points higher in SBP compared to our machine. Increased the dose of Vaksartan to 160mg. Will start also with medication for weight loss.

## 2024-08-07 ENCOUNTER — OFFICE VISIT (OUTPATIENT)
Facility: CLINIC | Age: 49
End: 2024-08-07

## 2024-08-07 VITALS
DIASTOLIC BLOOD PRESSURE: 86 MMHG | OXYGEN SATURATION: 97 % | SYSTOLIC BLOOD PRESSURE: 123 MMHG | HEART RATE: 71 BPM | RESPIRATION RATE: 14 BRPM

## 2024-08-07 DIAGNOSIS — I10 ESSENTIAL (PRIMARY) HYPERTENSION: Primary | ICD-10-CM

## 2024-08-07 DIAGNOSIS — G45.9 TIA (TRANSIENT ISCHEMIC ATTACK): ICD-10-CM

## 2024-08-07 RX ORDER — VALSARTAN 80 MG/1
80 TABLET ORAL DAILY
Qty: 30 TABLET | Refills: 5 | Status: SHIPPED
Start: 2024-08-07

## 2024-08-07 RX ORDER — ASPIRIN 81 MG/1
81 TABLET, CHEWABLE ORAL DAILY
Qty: 90 TABLET | Refills: 1 | Status: SHIPPED | OUTPATIENT
Start: 2024-08-07

## 2024-08-07 RX ORDER — ROSUVASTATIN CALCIUM 40 MG/1
40 TABLET, COATED ORAL NIGHTLY
Qty: 90 TABLET | Refills: 1 | Status: SHIPPED | OUTPATIENT
Start: 2024-08-07

## 2024-08-07 NOTE — PROGRESS NOTES
Rachelle Corrales is being seen today for a Blood Pressure Recheck.     Rachelle Corrales was seen 7/24/2024 and her Blood Pressure was:   BP Readings from Last 1 Encounters:   08/07/24 123/86           Sammie Ryan

## 2024-08-12 NOTE — ED PROVIDER NOTES
EMERGENCY DEPARTMENT HISTORY AND PHYSICAL EXAM 
 
11:12 AM 
 
 
Date: 2019 Patient Name: Wen Thomas History of Presenting Illness Chief Complaint Patient presents with  Tingling  Facial Droop History Provided By: Patient Chief Complaint: Facial Droop Duration: 1 Hours Timing:  Acute Location: Left Side of Face Quality: Tightness Severity: N/A Modifying Factors: No modifying or aggravating factors were reported. Associated Symptoms: Tingling/numbness, blurred vision Additional History (Context): Wen Thomas is a 44 y.o. female with PMHx significant for Migraines who presents via Car with CC of acute left sided facial droop that started 1 hour ago. Pt reports that she was sitting at her desk when she started feeling a tightness in the back side of the left of her head. She then noticed her vision became blurred. Her coworkers then noticed that her face was drooping on the left side of her face. She felt a mild tingling in the left of her face and her left arm. She reports she smokes 5 cigarettes a day. She also reports she has been under a lot of stress recent;y. No modifying or aggravating factors were reported. No other symptoms or concerns were expressed. PCP: Daksha Chandler NP Past History Past Medical History: 
Past Medical History:  
Diagnosis Date  Migraines Past Surgical History: 
Past Surgical History:  
Procedure Laterality Date [address]  
 two times  HX HYSTERECTOMY Family History: 
Family History Problem Relation Age of Onset  Cancer Other   
     mother side of family  Diabetes Other   
     mother side of family  Hypertension Other   
     mother side of family  Breast Cancer Paternal Grandmother   
       Breast Cancer Maternal Aunt  Social History: 
Social History Tobacco Use  
 Notified Ainsley and she will let the patient know as well of med change.      Smoking status: Current Every Day Smoker Packs/day: 0.50 Types: Cigarettes  Smokeless tobacco: Never Used Substance Use Topics  Alcohol use: Yes Alcohol/week: 0.5 oz Types: 1 drink(s) per week Comment: wine occasionally  Drug use: No  
 
 
Allergies: Allergies Allergen Reactions  Banana Nausea Only  Seasonale [Levonorgestrel-Ethinyl Estrad] Runny Nose  Shellfish Derived Nausea Only  Wellbutrin [Bupropion] Other (comments) Amnesia type symptoms Review of Systems Review of Systems Constitutional: Negative for chills, fatigue and fever. HENT: Negative for congestion, rhinorrhea and sore throat. Eyes: Positive for visual disturbance (Blurred Vision). Respiratory: Negative for cough, shortness of breath and wheezing. Cardiovascular: Negative for chest pain, palpitations and leg swelling. Gastrointestinal: Negative for abdominal pain, diarrhea, nausea and vomiting. Genitourinary: Negative for difficulty urinating and dysuria. Musculoskeletal: Negative for arthralgias. Skin: Negative for rash. Neurological: Positive for facial asymmetry and numbness (TIngling). Negative for weakness and headaches. All other systems reviewed and are negative. Physical Exam  
 
Visit Vitals /69 Pulse 86 Temp 98.5 °F (36.9 °C) Resp 20 Ht 5' 9\" (1.753 m) Wt 113.4 kg (250 lb) LMP  (LMP Unknown) SpO2 100% BMI 36.92 kg/m² Physical Exam  
Constitutional: She is oriented to person, place, and time. She appears well-developed and well-nourished. No distress. Obese HENT:  
Head: Normocephalic and atraumatic. Mouth/Throat: Oropharynx is clear and moist and mucous membranes are normal. No oropharyngeal exudate. Eyes: Conjunctivae and EOM are normal. No scleral icterus. Neck: Normal range of motion. Neck supple. No JVD present. No thyromegaly present. Cardiovascular: Normal rate, regular rhythm, S1 normal, S2 normal, normal heart sounds and intact distal pulses. Exam reveals no gallop and no friction rub. No murmur heard. Pulmonary/Chest: Effort normal and breath sounds normal. No accessory muscle usage. No respiratory distress. She has no wheezes. She has no rhonchi. She has no rales. She exhibits no tenderness. Abdominal: Soft. Normal appearance and bowel sounds are normal. She exhibits no distension and no pulsatile midline mass. There is no tenderness. There is no rebound and no guarding. Musculoskeletal: Normal range of motion. Lymphadenopathy:  
     Head (right side): No submandibular adenopathy present. She has no cervical adenopathy. Neurological: She is alert and oriented to person, place, and time. Moving all extremities. Left Sided Facial droop Some decreased sensation Forehead is spared Skin: Skin is warm, dry and intact. No rash noted. No erythema. Psychiatric: She has a normal mood and affect. Her speech is normal and behavior is normal.  
Nursing note and vitals reviewed. Diagnostic Study Results Labs - Recent Results (from the past 12 hour(s)) GLUCOSE, POC Collection Time: 01/16/19 11:07 AM  
Result Value Ref Range Glucose (POC) 83 70 - 110 mg/dL CBC W/O DIFF Collection Time: 01/16/19 11:30 AM  
Result Value Ref Range WBC 11.7 4.6 - 13.2 K/uL  
 RBC 5.03 4.20 - 5.30 M/uL  
 HGB 14.4 12.0 - 16.0 g/dL HCT 43.5 35.0 - 45.0 % MCV 86.5 74.0 - 97.0 FL  
 MCH 28.6 24.0 - 34.0 PG  
 MCHC 33.1 31.0 - 37.0 g/dL  
 RDW 13.2 11.6 - 14.5 % PLATELET 956 183 - 567 K/uL MPV 10.4 9.2 - 38.1 FL  
METABOLIC PANEL, COMPREHENSIVE Collection Time: 01/16/19 11:30 AM  
Result Value Ref Range Sodium 138 136 - 145 mmol/L Potassium 4.3 3.5 - 5.5 mmol/L Chloride 106 100 - 108 mmol/L  
 CO2 27 21 - 32 mmol/L Anion gap 5 3.0 - 18 mmol/L Glucose 86 74 - 99 mg/dL  BUN 12 7.0 - 18 MG/DL  
 Creatinine 1.12 0.6 - 1.3 MG/DL  
 BUN/Creatinine ratio 11 (L) 12 - 20 GFR est AA >60 >60 ml/min/1.73m2 GFR est non-AA 53 (L) >60 ml/min/1.73m2 Calcium 8.3 (L) 8.5 - 10.1 MG/DL Bilirubin, total 0.3 0.2 - 1.0 MG/DL  
 ALT (SGPT) 13 13 - 56 U/L  
 AST (SGOT) 14 (L) 15 - 37 U/L Alk. phosphatase 111 45 - 117 U/L Protein, total 6.7 6.4 - 8.2 g/dL Albumin 3.3 (L) 3.4 - 5.0 g/dL Globulin 3.4 2.0 - 4.0 g/dL A-G Ratio 1.0 0.8 - 1.7 PROTHROMBIN TIME + INR Collection Time: 01/16/19 11:30 AM  
Result Value Ref Range Prothrombin time 13.1 11.5 - 15.2 sec INR 1.0 0.8 - 1.2 THROMBIN TIME Collection Time: 01/16/19 11:30 AM  
Result Value Ref Range Thrombin time 14.9 13.8 - 18.2 SECS FIBRINOGEN Collection Time: 01/16/19 11:30 AM  
Result Value Ref Range Fibrinogen 464 (H) 210 - 451 mg/dL TYPE & SCREEN Collection Time: 01/16/19 11:30 AM  
Result Value Ref Range Crossmatch Expiration 01/19/2019 ABO/Rh(D) O POSITIVE Antibody screen PENDING   
ASPIRIN TEST Collection Time: 01/16/19 11:30 AM  
Result Value Ref Range Aspirin test 564 (L) 620 - 672 ARU  
URINALYSIS W/ RFLX MICROSCOPIC Collection Time: 01/16/19 11:57 AM  
Result Value Ref Range Color YELLOW Appearance CLOUDY Specific gravity 1.019 1.005 - 1.030    
 pH (UA) 6.0 5.0 - 8.0 Protein NEGATIVE  NEG mg/dL Glucose NEGATIVE  NEG mg/dL Ketone NEGATIVE  NEG mg/dL Bilirubin NEGATIVE  NEG Blood NEGATIVE  NEG Urobilinogen 1.0 0.2 - 1.0 EU/dL Nitrites NEGATIVE  NEG Leukocyte Esterase SMALL (A) NEG    
DRUG SCREEN, URINE Collection Time: 01/16/19 11:57 AM  
Result Value Ref Range BENZODIAZEPINES NEGATIVE  NEG    
 BARBITURATES NEGATIVE  NEG    
 THC (TH-CANNABINOL) NEGATIVE  NEG    
 OPIATES NEGATIVE  NEG    
 PCP(PHENCYCLIDINE) NEGATIVE  NEG    
 COCAINE NEGATIVE  NEG    
 AMPHETAMINES NEGATIVE  NEG METHADONE NEGATIVE  NEG HDSCOM (NOTE) EKG, 12 LEAD, INITIAL Collection Time: 01/16/19 11:57 AM  
Result Value Ref Range Ventricular Rate 87 BPM  
 Atrial Rate 87 BPM  
 P-R Interval 180 ms QRS Duration 94 ms Q-T Interval 370 ms QTC Calculation (Bezet) 445 ms Calculated P Axis 51 degrees Calculated R Axis 26 degrees Calculated T Axis 44 degrees Diagnosis Normal sinus rhythm Possible Left atrial enlargement Low voltage QRS Cannot rule out Anterior infarct , age undetermined Abnormal ECG No previous ECGs available Radiologic Studies - Ct Head Wo Cont Result Date: 1/16/2019 EXAM: CT head HISTORY: -From Provider: STROKE -Additional: Left-sided facial droop numbness and tingling for 1.5 hours. CODE S. COMPARISON: None. TECHNIQUE: Axial CT imaging of the head was performed without intravenous contrast. Sagittal and coronal reconstructions were created from the axial data. One or more dose reduction techniques were used on this CT: automated exposure control, adjustment of the mAs and/or kVp according to patient size, and iterative reconstruction techniques. The specific techniques used on this CT exam have been documented in the patient's electronic medical record. Digital Imaging and Communications in Medicine (DICOM) format image data are available to nonaffiliated external healthcare facilities or entities on a secure, media free, reciprocally searchable basis with patient authorization for at least a 12-month period after this study. _______________ FINDINGS: Critical result. BRAIN, POSTERIOR FOSSA, EXTRA-AXIAL SPACES AND MENINGES:  The sulci, folia, ventricles and basal cisterns are   within normal limits for the patient's age. There are no areas of abnormal parenchymal attenuation. Moderate beam hardening artifact degrades imaging of the temporal lobes. There is no intracranial hemorrhage, mass effect, or midline shift. There are no abnormal extra-axial fluid collections. CALVARIUM: Intact. SINUSES/MASTOIDS: Clear in visualized extent. OTHER: None. _______________ IMPRESSION: 1. No acute intracranial abnormalities are identified. Critical result called to Dr. Ryan Ortiz at 11:20 AM on 01/16/18 as the study was performed with stroke alert protocol. Please note that head CT may be negative in the acute setting and MRI could best further evaluate for acute/subacute ischemia/infarct in the high/persistent index of clinical suspicion. Xr Chest Silvia Collet Result Date: 1/16/2019 CHEST AP PORTABLE Indication: Suspected stroke. Comparison: None. Findings: The lungs appear clear. The cardiac silhouette and pulmonary vascularity appear within normal limits. No evidence for pneumothorax or pleural effusion. Impression: No acute cardiopulmonary disease. Medical Decision Making I am the first provider for this patient. I reviewed the vital signs, available nursing notes, past medical history, past surgical history, family history and social history. Vital Signs-Reviewed the patient's vital signs. Pulse Oximetry Analysis -  98% on room air (Interpretation)NOrmal 
 
Cardiac Monitor: 
Rate: 101 bpm 
Rhythm:  Sinus Tachycardia EKG: Interpreted by the EP. Time Interpreted: 11:57AM 
 Rate: 87 bpm 
 Rhythm: Normal Sinus Rhythm Interpretation: Normal axis, normal intervals, no obvious ST or T wave changes Comparison: No previous for comparison Records Reviewed: Nursing Notes (Time of Review: 11:12 AM) Provider Notes (Medical Decision Making): ASSESSMENT / PLAN: 
   44y/o AA woman, no PMHx other than obesity and tobacco use presents with 1hr of left lower facial droop that occurred while at rest at work. Started w/tingling on left back side of head (no pain) and then tingling on left face then droop. No other symptoms, never had this before. Came to ED via POV    On exam, obese AA woman sitting up in wheelchair, well dressed in business attire, NAD, pleasant. Frannietlsn omrla. HEENT, lung, CV, abd benign. Concerning for CVA. Forehead is spared so likely no peripheral/bells palsy. Could be atypical migraine, or other local neropathy. Electroltye disturbance on ddx as well 
-Stroke alerted on arrival 
-Head CT 
-EKG 
-CXR 
-CBC, CMP, Coags, Card Enz 
-UA, UPT 
-Teleneuro  Consulted 
-Anticipate admission Portia Roy MD 
EM-IM Physician ED Course: Progress Notes, Reevaluation, and Consults: 
UPDATE 1:34 PM 
-Head CT and labs benign 
-Teleneuro thinks small CVA vs atypical migraine 
-Teleneuro and myself discused risk/benefits of TPA, pt chose not to try TPA due to low symptoms burden (low NIHSS) and risk associated. -PO Aspriin 
-Admit for Stroke workup James Ferreira MD 
EM-IM Physician  
 
 
1:18 PM Consult:  Discussed care with Dr. Marnie Mathur, Hospitalist. Standard discussion; including history of patients chief complaint, available diagnostic results, and treatment course. He will consult the pt. Critical Care Time:  
Critical Care Time: The services I provided to this patient were to treat and/or prevent clinically significant deterioration that could result in the failure of one or more body systems and/or organ systems due to Acute Stroke. Services included the following: 
-reviewing nursing notes and old charts 
-vital sign assessments 
-direct patient care 
-medication orders and management 
-interpreting and reviewing diagnostic studies/labs 
-re-evaluations 
-documentation time Aggregate critical care time was 50 minutes, which includes only time during which I was engaged in work directly related to the patient's care as described above, whether I was at bedside or elsewhere in the Emergency Department. It did not include time spent performing other reported procedures or the services of residents, students, nurses, or advance practice providers.  
 
  
Jenny Hays MD 
 
1:35 PM 
 
 For Hospitalized Patients: 
1. Hospitalization Decision Time: The decision to hospitalize the patient was made by Dr. Lawrence Chopra at 1:36 PM on 1/16/2019 2. Aspirin: Aspirin was given Diagnosis Clinical Impression: 1. Cerebrovascular accident (CVA), unspecified mechanism ([address] Disposition:  Admit Follow-up Information None Medication List  
  
You have not been prescribed any medications. _______________________________ Attestations: 
Scribe Attestation Palak Mariaamento  acting as a scribe for and in the presence of Valentin Kimble MD     
January 16, 2019 at 11:12 AM 
    
Provider Attestation:     
I personally performed the services described in the documentation, reviewed the documentation, as recorded by the scribe in my presence, and it accurately and completely records my words and actions. January 16, 2019 at 11:12 AM - Valentin Kimble MD   
_______________________________

## 2024-08-30 ENCOUNTER — OFFICE VISIT (OUTPATIENT)
Facility: CLINIC | Age: 49
End: 2024-08-30

## 2024-08-30 DIAGNOSIS — I10 ESSENTIAL (PRIMARY) HYPERTENSION: ICD-10-CM

## 2024-08-30 DIAGNOSIS — Z20.822 EXPOSURE TO COVID-19 VIRUS: Primary | ICD-10-CM

## 2024-08-30 LAB
EXP DATE SOLUTION: NORMAL
EXP DATE SWAB: NORMAL
EXPIRATION DATE: NORMAL
LOT NUMBER POC: NORMAL
LOT NUMBER SOLUTION: NORMAL
LOT NUMBER SWAB: NORMAL
SARS-COV-2 RNA, POC: NEGATIVE

## 2024-08-30 PROCEDURE — 87635 SARS-COV-2 COVID-19 AMP PRB: CPT | Performed by: STUDENT IN AN ORGANIZED HEALTH CARE EDUCATION/TRAINING PROGRAM

## 2024-08-30 NOTE — TELEPHONE ENCOUNTER
Medication(s) requesting:   Requested Prescriptions     Pending Prescriptions Disp Refills    valsartan (DIOVAN) 80 MG tablet 30 tablet 5     Sig: Take 1 tablet by mouth daily       Last office visit:  06/07/2024  Next office visit [facility]: 9/20/2024

## 2024-08-30 NOTE — PROGRESS NOTES
Patient came in for Covid testing due to a recent encounter with a friend who tested positive. Covid test is negative.

## 2024-09-03 RX ORDER — VALSARTAN 80 MG/1
80 TABLET ORAL DAILY
Qty: 30 TABLET | Refills: 5 | Status: SHIPPED | OUTPATIENT
Start: 2024-09-03

## 2024-11-12 DIAGNOSIS — N30.00 ACUTE CYSTITIS WITHOUT HEMATURIA: Primary | ICD-10-CM

## 2024-11-12 RX ORDER — NITROFURANTOIN 25; 75 MG/1; MG/1
100 CAPSULE ORAL 2 TIMES DAILY
Qty: 10 CAPSULE | Refills: 0 | Status: SHIPPED | OUTPATIENT
Start: 2024-11-12 | End: 2024-11-17